# Patient Record
Sex: FEMALE | Race: WHITE | NOT HISPANIC OR LATINO | URBAN - METROPOLITAN AREA
[De-identification: names, ages, dates, MRNs, and addresses within clinical notes are randomized per-mention and may not be internally consistent; named-entity substitution may affect disease eponyms.]

---

## 2023-06-13 ENCOUNTER — NURSING HOME VISIT (OUTPATIENT)
Dept: POST ACUTE CARE | Facility: EXTERNAL LOCATION | Age: 78
End: 2023-06-13
Payer: MEDICARE

## 2023-06-13 DIAGNOSIS — G45.9 TIA (TRANSIENT ISCHEMIC ATTACK): ICD-10-CM

## 2023-06-13 DIAGNOSIS — I10 BENIGN ESSENTIAL HYPERTENSION: ICD-10-CM

## 2023-06-13 DIAGNOSIS — M54.16 LUMBAR RADICULOPATHY: ICD-10-CM

## 2023-06-13 DIAGNOSIS — Z98.890 S/P LAMINECTOMY: Primary | ICD-10-CM

## 2023-06-13 DIAGNOSIS — E03.9 ACQUIRED HYPOTHYROIDISM: ICD-10-CM

## 2023-06-13 DIAGNOSIS — E78.2 HYPERLIPEMIA, MIXED: ICD-10-CM

## 2023-06-13 DIAGNOSIS — K21.9 GERD WITHOUT ESOPHAGITIS: ICD-10-CM

## 2023-06-13 DIAGNOSIS — Z02.2 ENCOUNTER FOR EXAMINATION FOR ADMISSION TO NURSING HOME: ICD-10-CM

## 2023-06-13 DIAGNOSIS — Z98.890 HISTORY OF CEA (CAROTID ENDARTERECTOMY): ICD-10-CM

## 2023-06-13 DIAGNOSIS — M48.062 SPINAL STENOSIS OF LUMBAR REGION WITH NEUROGENIC CLAUDICATION: ICD-10-CM

## 2023-06-13 PROCEDURE — 99306 1ST NF CARE HIGH MDM 50: CPT | Performed by: INTERNAL MEDICINE

## 2023-06-13 NOTE — PROGRESS NOTES
Assessment and Plan:  Problem List Items Addressed This Visit          Nervous    Lumbar radiculopathy     B12 folic acid Lyrica PT OT            Circulatory    History of CEA (carotid endarterectomy) - Primary    Benign essential hypertension     Patients BP readings reviewed and addressed, as we age our arteries turn stiffer and less elastic. Restricting salt consumption and staying physically fit with regular exercise regimen is the only way to keep our vasculature less tonic. Studies have shown that keeping ideal body wt, exercise routine about 140 to 150 minutes a week, eating variety of plant based diet and drinking plentiful water are quite helpful. Monitor BP twice or once a week at home and bring log to be reviewed by me. Uncontrolled BP has long term consequences including heart failure, myocardial infarction, accelerated atherosclerosis and kidney dysfunction. Therapy reviewed and explained.           TIA (transient ischemic attack)       Digestive    GERD without esophagitis       Musculoskeletal    Spinal stenosis of lumbar region with neurogenic claudication     Outpatient follow-up with the neurosurgery            Endocrine/Metabolic    Acquired hypothyroidism       Other    Hyperlipemia, mixed    Encounter for examination for admission to nursing home     Rx list reviewed. PT and OT evaluation is in the process. Routine safety measures, fall precautions, risk modification and alarm placement if needed for prevention of falls. Skin care precautions, prevention of pressures sores at pressure points assessed. Pt needs to be monitored frequently by nursing staff particularly at night time. Any confusion, agitation or behavioural disturbance needs to be attended, as per home policy rapid covid Ag assay need to be done, notify if positive. If needed appropriate measures to be taken for alarm placements and assisted devices, pt was told not to get up and ambulate at night unless help and assist available at  bedside, labs will be done as per our routine protocol. PO intake need to be monitored if consuming po.     Chief Complaint:   Acute on chronic lower back pain status post lumbar laminectomy post for surgery decreased ADL mobility admitted for PT OT physical therapy Occupational Therapy fall prevention and pain medication      HPI:77 year old female admitted to [facility] on  for rehab. Had a lumbar lami and decompression, intertransverse fusion, spinal instrumentation L2-L3, and exploration and removal of fusion L3-L4 for spinal stenosis/spondylosis at . Was started on Lyrica post-op for RLE radiculopathy.  Continue back pain arthralgia myalgia fatigue tired constipation decreased ADL decreased mobility    Negative for hypoxia hypotension    Negative for fever or chills    Negative for bladder bowel dysfunction    PMHx: CAD, HTN, HLD, TIA, OA, hypothyroidism, GERD, OAB, spinal stenosis, carotid artery disease, aortic valve stenosis, cervical stenosis, COVID-19, PVD, RA,     PSHx: L TKR, R CEA, PCI with stents, bladder stimulator placement and removal, L3-L5 lumbar laminectomy, cystoscopy, hysterectomy,  x 3    Social Hx: , lives alone. Denies smoking, ETOH. Was using CBD cookies for pain relief prior to surgery.      : Na-139 Cl-107 K-4.8 Glucose-112 BUN-6 Creat-0.6 H&H-9.7/28.6 WBC-8.8 Platelets-235  : Iron-31 TIBC-287 % sat-10.8 Hgb A1c-4.8  : TSH-1.77        Patient Active Problem List:  Patient Active Problem List   Diagnosis    History of CEA (carotid endarterectomy)    Spinal stenosis of lumbar region with neurogenic claudication    Acquired hypothyroidism    GERD without esophagitis    Benign essential hypertension    Hyperlipemia, mixed    TIA (transient ischemic attack)    Lumbar radiculopathy    Encounter for examination for admission to nursing home          Comprehensive Medical/Surgical/Social/Family History:  No family history on file.    History reviewed. No pertinent past  medical history.    History reviewed. No pertinent surgical history.    Social History     Socioeconomic History    Marital status:      Spouse name: None    Number of children: None    Years of education: None    Highest education level: None   Occupational History    None   Tobacco Use    Smoking status: Never    Smokeless tobacco: Never   Substance and Sexual Activity    Alcohol use: Not Currently    Drug use: Not Currently    Sexual activity: None   Other Topics Concern    None   Social History Narrative    None     Social Determinants of Health     Financial Resource Strain: Not on file   Food Insecurity: Not on file   Transportation Needs: Not on file   Physical Activity: Not on file   Stress: Not on file   Social Connections: Not on file   Intimate Partner Violence: Not on file   Housing Stability: Not on file       Tobacco/Alcohol/Opioid use, as well as Illicit Drug Use was screened for/reviewed and documented in Social Documentation section of the chart and medication list as appropriate    Allergies and Medications  Not on File  No current outpatient medications on file.     No current facility-administered medications for this visit.       Medications and Supplements  prescribed by me and other practitioners or clinical pharmacist (such as prescriptions, OTC's, herbal therapies and supplements) were reviewed and documented in the medical record.     Advance directives  Advanced Care Planning (including a Living Will, Healthcare POA, as well as specific end of life choices and/or directives), was discussed for approximately 16 minutes with the patient and/or surrogate, voluntarily, and documented in the Problem List of the medical record.     Cardiac Risk Assessment  Cardiovascular risk was discussed and, if needed, lifestyle modifications recommended, including nutritional choices, exercise, and elimination of habits contributing to risk. We agreed on a plan to reduce the current cardiovascular  risk based on above discussion as needed.  Aspirin use/disuse was discussed and documented in the Problem List of the medical record after reviewing the updated guidelines below:    Consider low dose Aspirin ( mg) use if the benefit for cardiovascular disease prevention outweighs risk for bleeding complications.   In general, low dose ASA should be considered:  In patients WITHOUT prior MI/stroke/PAD (primary prevention):   a. Age <60: Use if 10-year cardiovascular disease risk >20%, with discussion of risks and benefits with patient  b. Age 60-<70: Use if 10-year cardiovascular disease risk >20% and low bleeding (e.g., gastrointenstinal) risk, with discussion of risks and benefits with patient  c. Age >=70: Do not use    In patients WITH prior MI/stroke/PAD (secondary prevention):   Generally use unless extremely high bleeding (e.g., gastrointenstinal) risk, with discussion of risks and benefits with patient    ROS otherwise negative aside from what was mentioned above in HPI.    Visit Vitals  Smoking Status Never     Current Vitals   BP: 130/72 mmHg  6/11/2023 12:32  Temp:98.1 °F  6/11/2023 12:32  Pulse:78 bpm  6/11/2023 12:32    Weight:182.8 Lbs  6/12/2023 07:38  Resp:17 Breaths/min  6/11/2023 12:32  BS:  O2:98 %  6/11/2023 12:32  Pain:6  6/13/2023 09:04  Physical Exam      Physical Exam:    Appearance: Alert, oriented , cooperative,  in no acute distress. Well nourished & well hydrated.    Skin: Intact,  dry skin, no lesions, rash, petechiae or purpura.     Eyes: PERRLA, EOMs intact,  Conjunctiva pink with no redness or exudates. Cornea & anterior chamber are clear, Eyelids without lesions. No scleral icterus.     ENT: Hearing grossly intact. External auditory canals patent, tympanic membranes intact with visible landmarks. Nares patent, mucus membranes moist. Dentition without lesions. Pharynx clear, uvula midline.     Neck: Supple, without meningismus. Thyroid not palpable. Trachea at midline. No  lymphadenopathy.    Pulmonary: Expiratory rhonchi  Cardiac: Systolic diastolic heart murmur  Abdomen: Soft, nontender, active bowel sounds.  No palpable organomegaly.  No rebound or guarding.  No CVA tenderness.    Genitourinary: Exam deferred.    Musculoskeletal: Postoperative lumbosacral tenderness more on the right compared to left  Neurological: Right sciatica tingling numbness  Psychiatric: Appropriate mood and affect.     During the course of the visit the patient was educated and counseled about age appropriate screening and preventive services. Completed preventive screenings were documented in the chart and orders were placed for outstanding screenings/procedures as documented in the Assessment and Plan.    Patient Instructions (the written plan) was given to the patient at check out.    Charting was completed using voice recognition technology and may include unintended errors.

## 2023-06-13 NOTE — LETTER
Patient: Freedom Jose  : 1945    Encounter Date: 2023    Assessment and Plan:  Problem List Items Addressed This Visit          Nervous    Lumbar radiculopathy     B12 folic acid Lyrica PT OT            Circulatory    History of CEA (carotid endarterectomy) - Primary    Benign essential hypertension     Patients BP readings reviewed and addressed, as we age our arteries turn stiffer and less elastic. Restricting salt consumption and staying physically fit with regular exercise regimen is the only way to keep our vasculature less tonic. Studies have shown that keeping ideal body wt, exercise routine about 140 to 150 minutes a week, eating variety of plant based diet and drinking plentiful water are quite helpful. Monitor BP twice or once a week at home and bring log to be reviewed by me. Uncontrolled BP has long term consequences including heart failure, myocardial infarction, accelerated atherosclerosis and kidney dysfunction. Therapy reviewed and explained.           TIA (transient ischemic attack)       Digestive    GERD without esophagitis       Musculoskeletal    Spinal stenosis of lumbar region with neurogenic claudication     Outpatient follow-up with the neurosurgery            Endocrine/Metabolic    Acquired hypothyroidism       Other    Hyperlipemia, mixed    Encounter for examination for admission to nursing home     Rx list reviewed. PT and OT evaluation is in the process. Routine safety measures, fall precautions, risk modification and alarm placement if needed for prevention of falls. Skin care precautions, prevention of pressures sores at pressure points assessed. Pt needs to be monitored frequently by nursing staff particularly at night time. Any confusion, agitation or behavioural disturbance needs to be attended, as per home policy rapid covid Ag assay need to be done, notify if positive. If needed appropriate measures to be taken for alarm placements and assisted devices, pt was  told not to get up and ambulate at night unless help and assist available at bedside, labs will be done as per our routine protocol. PO intake need to be monitored if consuming po.     Chief Complaint:   Acute on chronic lower back pain status post lumbar laminectomy post for surgery decreased ADL mobility admitted for PT OT physical therapy Occupational Therapy fall prevention and pain medication      HPI:77 year old female admitted to [facility] on  for rehab. Had a lumbar lami and decompression, intertransverse fusion, spinal instrumentation L2-L3, and exploration and removal of fusion L3-L4 for spinal stenosis/spondylosis at . Was started on Lyrica post-op for RLE radiculopathy.  Continue back pain arthralgia myalgia fatigue tired constipation decreased ADL decreased mobility    Negative for hypoxia hypotension    Negative for fever or chills    Negative for bladder bowel dysfunction    PMHx: CAD, HTN, HLD, TIA, OA, hypothyroidism, GERD, OAB, spinal stenosis, carotid artery disease, aortic valve stenosis, cervical stenosis, COVID-19, PVD, RA,     PSHx: L TKR, R CEA, PCI with stents, bladder stimulator placement and removal, L3-L5 lumbar laminectomy, cystoscopy, hysterectomy,  x 3    Social Hx: , lives alone. Denies smoking, ETOH. Was using CBD cookies for pain relief prior to surgery.      : Na-139 Cl-107 K-4.8 Glucose-112 BUN-6 Creat-0.6 H&H-9.7/28.6 WBC-8.8 Platelets-235  : Iron-31 TIBC-287 % sat-10.8 Hgb A1c-4.8  : TSH-1.77        Patient Active Problem List:  Patient Active Problem List   Diagnosis   • History of CEA (carotid endarterectomy)   • Spinal stenosis of lumbar region with neurogenic claudication   • Acquired hypothyroidism   • GERD without esophagitis   • Benign essential hypertension   • Hyperlipemia, mixed   • TIA (transient ischemic attack)   • Lumbar radiculopathy   • Encounter for examination for admission to nursing home          Comprehensive  Medical/Surgical/Social/Family History:  No family history on file.    History reviewed. No pertinent past medical history.    History reviewed. No pertinent surgical history.    Social History     Socioeconomic History   • Marital status:      Spouse name: None   • Number of children: None   • Years of education: None   • Highest education level: None   Occupational History   • None   Tobacco Use   • Smoking status: Never   • Smokeless tobacco: Never   Substance and Sexual Activity   • Alcohol use: Not Currently   • Drug use: Not Currently   • Sexual activity: None   Other Topics Concern   • None   Social History Narrative   • None     Social Determinants of Health     Financial Resource Strain: Not on file   Food Insecurity: Not on file   Transportation Needs: Not on file   Physical Activity: Not on file   Stress: Not on file   Social Connections: Not on file   Intimate Partner Violence: Not on file   Housing Stability: Not on file       Tobacco/Alcohol/Opioid use, as well as Illicit Drug Use was screened for/reviewed and documented in Social Documentation section of the chart and medication list as appropriate    Allergies and Medications  Not on File  No current outpatient medications on file.     No current facility-administered medications for this visit.       Medications and Supplements  prescribed by me and other practitioners or clinical pharmacist (such as prescriptions, OTC's, herbal therapies and supplements) were reviewed and documented in the medical record.     Advance directives  Advanced Care Planning (including a Living Will, Healthcare POA, as well as specific end of life choices and/or directives), was discussed for approximately 16 minutes with the patient and/or surrogate, voluntarily, and documented in the Problem List of the medical record.     Cardiac Risk Assessment  Cardiovascular risk was discussed and, if needed, lifestyle modifications recommended, including nutritional choices,  exercise, and elimination of habits contributing to risk. We agreed on a plan to reduce the current cardiovascular risk based on above discussion as needed.  Aspirin use/disuse was discussed and documented in the Problem List of the medical record after reviewing the updated guidelines below:    Consider low dose Aspirin ( mg) use if the benefit for cardiovascular disease prevention outweighs risk for bleeding complications.   In general, low dose ASA should be considered:  In patients WITHOUT prior MI/stroke/PAD (primary prevention):   a. Age <60: Use if 10-year cardiovascular disease risk >20%, with discussion of risks and benefits with patient  b. Age 60-<70: Use if 10-year cardiovascular disease risk >20% and low bleeding (e.g., gastrointenstinal) risk, with discussion of risks and benefits with patient  c. Age >=70: Do not use    In patients WITH prior MI/stroke/PAD (secondary prevention):   Generally use unless extremely high bleeding (e.g., gastrointenstinal) risk, with discussion of risks and benefits with patient    ROS otherwise negative aside from what was mentioned above in HPI.    Visit Vitals  Smoking Status Never     Current Vitals   BP: 130/72 mmHg  6/11/2023 12:32  Temp:98.1 °F  6/11/2023 12:32  Pulse:78 bpm  6/11/2023 12:32    Weight:182.8 Lbs  6/12/2023 07:38  Resp:17 Breaths/min  6/11/2023 12:32  BS:  O2:98 %  6/11/2023 12:32  Pain:6  6/13/2023 09:04  Physical Exam      Physical Exam:    Appearance: Alert, oriented , cooperative,  in no acute distress. Well nourished & well hydrated.    Skin: Intact,  dry skin, no lesions, rash, petechiae or purpura.     Eyes: PERRLA, EOMs intact,  Conjunctiva pink with no redness or exudates. Cornea & anterior chamber are clear, Eyelids without lesions. No scleral icterus.     ENT: Hearing grossly intact. External auditory canals patent, tympanic membranes intact with visible landmarks. Nares patent, mucus membranes moist. Dentition without lesions.  Pharynx clear, uvula midline.     Neck: Supple, without meningismus. Thyroid not palpable. Trachea at midline. No lymphadenopathy.    Pulmonary: Expiratory rhonchi  Cardiac: Systolic diastolic heart murmur  Abdomen: Soft, nontender, active bowel sounds.  No palpable organomegaly.  No rebound or guarding.  No CVA tenderness.    Genitourinary: Exam deferred.    Musculoskeletal: Postoperative lumbosacral tenderness more on the right compared to left  Neurological: Right sciatica tingling numbness  Psychiatric: Appropriate mood and affect.     During the course of the visit the patient was educated and counseled about age appropriate screening and preventive services. Completed preventive screenings were documented in the chart and orders were placed for outstanding screenings/procedures as documented in the Assessment and Plan.    Patient Instructions (the written plan) was given to the patient at check out.    Charting was completed using voice recognition technology and may include unintended errors.      Electronically Signed By: Sony Taylor MD   6/13/23 10:54 AM

## 2023-06-24 ENCOUNTER — NURSING HOME VISIT (OUTPATIENT)
Dept: POST ACUTE CARE | Facility: EXTERNAL LOCATION | Age: 78
End: 2023-06-24
Payer: MEDICARE

## 2023-06-24 DIAGNOSIS — J84.89 AUTOIMMUNE INTERSTITIAL LUNG DISEASE AND ARTHRITIS SYNDROME (MULTI): ICD-10-CM

## 2023-06-24 DIAGNOSIS — E03.9 ACQUIRED HYPOTHYROIDISM: ICD-10-CM

## 2023-06-24 DIAGNOSIS — I10 BENIGN ESSENTIAL HYPERTENSION: ICD-10-CM

## 2023-06-24 DIAGNOSIS — M48.062 SPINAL STENOSIS OF LUMBAR REGION WITH NEUROGENIC CLAUDICATION: ICD-10-CM

## 2023-06-24 DIAGNOSIS — M54.16 LUMBAR RADICULOPATHY: Primary | ICD-10-CM

## 2023-06-24 DIAGNOSIS — M19.90 AUTOIMMUNE INTERSTITIAL LUNG DISEASE AND ARTHRITIS SYNDROME (MULTI): ICD-10-CM

## 2023-06-24 DIAGNOSIS — K21.00 GASTROESOPHAGEAL REFLUX DISEASE WITH ESOPHAGITIS, UNSPECIFIED WHETHER HEMORRHAGE: ICD-10-CM

## 2023-06-24 PROCEDURE — 99308 SBSQ NF CARE LOW MDM 20: CPT | Performed by: INTERNAL MEDICINE

## 2023-06-24 NOTE — LETTER
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 Restricting salt consumption and staying physically fit with regular exercise regimen is the only way to keep our vasculature less tonic. Studies have shown that keeping ideal body wt, exercise routine about 140 to 150 minutes a week, eating variety of plant based diet and drinking plentiful water are quite helpful. Monitor BP twice or once a week at home and bring log to be reviewed by me. Uncontrolled BP has long term consequences including heart failure, myocardial infarction, accelerated atherosclerosis and kidney dysfunction. Therapy reviewed and explained.              Digestive    Gastroesophageal reflux disease with esophagitis       Musculoskeletal    Spinal stenosis of lumbar region with neurogenic claudication     Physical therapy Occupational Therapy narcotics watch for bladder bowel dysfunction            Endocrine/Metabolic    Acquired hypothyroidism     Check thyroid and calcium        Rx list reviewed. PT and OT evaluation is in the process. Routine safety measures, fall precautions, risk modification and alarm placement if needed for prevention of falls. Skin care precautions, prevention of pressures sores at pressure points assessed. Pt needs to be monitored frequently by nursing staff particularly at night time. Any confusion, agitation or behavioural disturbance needs to be attended, as per home policy rapid covid Ag assay need to be done,  notify if positive. If needed appropriate measures to be taken for alarm placements and assisted devices, pt was told not to get up and ambulate at night unless help and assist available at bedside, labs will be done as per our routine protocol. PO intake need to be monitored if consuming po.     Chief Complaint:   Acute on chronic lower back pain status post lumbar laminectomy post for surgery decreased ADL mobility admitted for PT OT physical therapy Occupational Therapy fall prevention and pain medication    77-year-old patient evaluated for nausea and stomach pain constipations thyroid dysfunction moderate pain advised continue Zofran omeprazole MiraLAX for pain continue Lyrica oxycodone autoimmune disease continue B12 folic acid methotrexate      Negative for hypoxia hypotension    Negative for fever or chills    Negative for bladder bowel dysfunction    PMHx: CAD, HTN, HLD, TIA, OA, hypothyroidism, GERD, OAB, spinal stenosis, carotid artery disease, aortic valve stenosis, cervical stenosis, COVID-19, PVD, RA,     PSHx: L TKR, R CEA, PCI with stents, bladder stimulator placement and removal, L3-L5 lumbar laminectomy, cystoscopy, hysterectomy,  x 3    Social Hx: , lives alone. Denies smoking, ETOH. Was using CBD cookies for pain relief prior to surgery.      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    History reviewed. No pertinent past medical history.    History reviewed. No pertinent surgical history.    Social History     Socioeconomic History   • Marital status:      Spouse name: None   • Number of children: None   • Years of education: None   • Highest education level: None   Occupational History   • None   Tobacco Use   • Smoking status: Never   • Smokeless tobacco: Never   Substance and Sexual Activity   • Alcohol use: Not Currently   • Drug use: Not Currently   • Sexual activity: None   Other Topics Concern   • None   Social History Narrative   • None     Social Determinants of Health     Financial Resource Strain: Not on file   Food Insecurity: Not on file   Transportation Needs: Not on file   Physical Activity: Not on file   Stress: Not on file   Social Connections: Not on file   Intimate Partner Violence: Not on file   Housing Stability: Not on file       Tobacco/Alcohol/Opioid use, as well as Illicit Drug Use was screened for/reviewed and documented in Social Documentation section of the chart and medication list as appropriate    Allergies and Medications  Not on File  No current outpatient medications on file.     No current facility-administered medications for this visit.               Visit Vitals  Smoking Status Never     Current Vitals   BP: 130/72 mmHg  6/11/2023 12:32  Temp:98.1 °F  6/11/2023 12:32  Pulse:78 bpm  6/11/2023 12:32    Weight:182.8 Lbs  6/12/2023 07:38  Resp:17 Breaths/min  6/11/2023 12:32  BS:  O2:98 %  6/11/2023 12:32  Pain:6  6/13/2023 09:04  Physical Exam      Physical Exam:    Appearance: Alert, oriented , cooperative,  in no acute distress. Well nourished & well hydrated.    Skin: Intact,  dry skin, no lesions, rash, petechiae or purpura.     Eyes: PERRLA, EOMs intact,  Conjunctiva pink with no redness or exudates. Cornea &  anterior chamber are clear, Eyelids without lesions. No scleral icterus.     ENT: Hearing grossly intact. External auditory canals patent, tympanic membranes intact with visible landmarks. Nares patent, mucus membranes moist. Dentition without lesions. Pharynx clear, uvula midline.     Neck: Supple, without meningismus. Thyroid not palpable. Trachea at midline. No lymphadenopathy.    Pulmonary: Expiratory rhonchi  Cardiac: Systolic diastolic heart murmur  Abdomen: Soft, nontender, active bowel sounds.  No palpable organomegaly.  No rebound or guarding.  No CVA tenderness.    Genitourinary: Exam deferred.    Musculoskeletal: Postoperative lumbosacral tenderness more on the right compared to left  Neurological: Right sciatica tingling numbness  Psychiatric: Appropriate mood and affect.     During the course of the visit the patient was educated and counseled about age appropriate screening and preventive services. Completed preventive screenings were documented in the chart and orders were placed for outstanding screenings/procedures as documented in the Assessment and Plan.    Patient Instructions (the written plan) was given to the patient at check out.    Charting was completed using voice recognition technology and may include unintended errors.      Electronically Signed By: Sony Taylor MD   6/24/23  9:58 AM

## 2023-06-24 NOTE — PROGRESS NOTES
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 Restricting salt consumption and staying physically fit with regular exercise regimen is the only way to keep our vasculature less tonic. Studies have shown that keeping ideal body wt, exercise routine about 140 to 150 minutes a week, eating variety of plant based diet and drinking plentiful water are quite helpful. Monitor BP twice or once a week at home and bring log to be reviewed by me. Uncontrolled BP has long term consequences including heart failure, myocardial infarction, accelerated atherosclerosis and kidney dysfunction. Therapy reviewed and explained.              Digestive    Gastroesophageal reflux disease with esophagitis       Musculoskeletal    Spinal stenosis of lumbar region with neurogenic claudication     Physical therapy Occupational Therapy narcotics watch for bladder bowel dysfunction            Endocrine/Metabolic    Acquired hypothyroidism     Check thyroid and calcium        Rx list reviewed. PT and OT evaluation is in the process. Routine safety measures, fall precautions, risk modification and alarm placement if needed for prevention of falls. Skin care precautions, prevention of pressures sores at pressure points assessed. Pt needs to be monitored frequently by nursing staff particularly at night time. Any confusion, agitation or behavioural disturbance needs to be attended, as per home policy rapid covid Ag assay need to be done, notify if positive. If needed appropriate measures to be taken for alarm  placements and assisted devices, pt was told not to get up and ambulate at night unless help and assist available at bedside, labs will be done as per our routine protocol. PO intake need to be monitored if consuming po.     Chief Complaint:   Acute on chronic lower back pain status post lumbar laminectomy post for surgery decreased ADL mobility admitted for PT OT physical therapy Occupational Therapy fall prevention and pain medication    77-year-old patient evaluated for nausea and stomach pain constipations thyroid dysfunction moderate pain advised continue Zofran omeprazole MiraLAX for pain continue Lyrica oxycodone autoimmune disease continue B12 folic acid methotrexate      Negative for hypoxia hypotension    Negative for fever or chills    Negative for bladder bowel dysfunction    PMHx: CAD, HTN, HLD, TIA, OA, hypothyroidism, GERD, OAB, spinal stenosis, carotid artery disease, aortic valve stenosis, cervical stenosis, COVID-19, PVD, RA,     PSHx: L TKR, R CEA, PCI with stents, bladder stimulator placement and removal, L3-L5 lumbar laminectomy, cystoscopy, hysterectomy,  x 3    Social Hx: , lives alone. Denies smoking, ETOH. Was using CBD cookies for pain relief prior to surgery.      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    History reviewed. No pertinent past medical history.    History reviewed. No pertinent surgical history.    Social History     Socioeconomic History    Marital status:      Spouse name: None    Number of children: None    Years of education: None    Highest education level: None   Occupational History    None   Tobacco Use    Smoking status: Never    Smokeless tobacco: Never   Substance and Sexual Activity    Alcohol use: Not Currently    Drug use: Not Currently    Sexual activity: None   Other Topics Concern    None   Social History Narrative    None     Social Determinants of Health     Financial Resource Strain: Not on file   Food Insecurity: Not on file   Transportation Needs: Not on file   Physical Activity: Not on file   Stress: Not on file   Social Connections: Not on file   Intimate Partner Violence: Not on file   Housing Stability: Not on file       Tobacco/Alcohol/Opioid use, as well as Illicit Drug Use was screened for/reviewed and documented in Social Documentation section of the chart and medication list as appropriate    Allergies and Medications  Not on File  No current outpatient medications on file.     No current facility-administered medications for this visit.               Visit Vitals  Smoking Status Never     Current Vitals   BP: 130/72 mmHg  6/11/2023 12:32  Temp:98.1 °F  6/11/2023 12:32  Pulse:78 bpm  6/11/2023 12:32    Weight:182.8 Lbs  6/12/2023 07:38  Resp:17 Breaths/min  6/11/2023 12:32  BS:  O2:98 %  6/11/2023 12:32  Pain:6  6/13/2023 09:04  Physical Exam      Physical Exam:    Appearance: Alert, oriented , cooperative,  in no acute distress. Well nourished & well hydrated.    Skin: Intact,  dry skin, no lesions, rash, petechiae or purpura.     Eyes: PERRLA, EOMs intact,  Conjunctiva pink with no redness or exudates. Cornea & anterior chamber are clear, Eyelids without lesions. No scleral icterus.     ENT:  Hearing grossly intact. External auditory canals patent, tympanic membranes intact with visible landmarks. Nares patent, mucus membranes moist. Dentition without lesions. Pharynx clear, uvula midline.     Neck: Supple, without meningismus. Thyroid not palpable. Trachea at midline. No lymphadenopathy.    Pulmonary: Expiratory rhonchi  Cardiac: Systolic diastolic heart murmur  Abdomen: Soft, nontender, active bowel sounds.  No palpable organomegaly.  No rebound or guarding.  No CVA tenderness.    Genitourinary: Exam deferred.    Musculoskeletal: Postoperative lumbosacral tenderness more on the right compared to left  Neurological: Right sciatica tingling numbness  Psychiatric: Appropriate mood and affect.     During the course of the visit the patient was educated and counseled about age appropriate screening and preventive services. Completed preventive screenings were documented in the chart and orders were placed for outstanding screenings/procedures as documented in the Assessment and Plan.    Patient Instructions (the written plan) was given to the patient at check out.    Charting was completed using voice recognition technology and may include unintended errors.

## 2024-02-20 ENCOUNTER — NURSING HOME VISIT (OUTPATIENT)
Dept: POST ACUTE CARE | Facility: EXTERNAL LOCATION | Age: 79
End: 2024-02-20
Payer: MEDICARE

## 2024-02-20 DIAGNOSIS — J84.89 AUTOIMMUNE INTERSTITIAL LUNG DISEASE AND ARTHRITIS SYNDROME (MULTI): ICD-10-CM

## 2024-02-20 DIAGNOSIS — I43 CARDIOMYOPATHY, HYPERTENSIVE, BENIGN, WITHOUT HEART FAILURE (MULTI): ICD-10-CM

## 2024-02-20 DIAGNOSIS — Z98.890 HISTORY OF CEA (CAROTID ENDARTERECTOMY): ICD-10-CM

## 2024-02-20 DIAGNOSIS — I11.9 CARDIOMYOPATHY, HYPERTENSIVE, BENIGN, WITHOUT HEART FAILURE (MULTI): ICD-10-CM

## 2024-02-20 DIAGNOSIS — M48.062 SPINAL STENOSIS OF LUMBAR REGION WITH NEUROGENIC CLAUDICATION: Primary | ICD-10-CM

## 2024-02-20 DIAGNOSIS — I25.118 CORONARY ARTERY DISEASE INVOLVING NATIVE HEART WITH OTHER FORM OF ANGINA PECTORIS, UNSPECIFIED VESSEL OR LESION TYPE (CMS-HCC): ICD-10-CM

## 2024-02-20 DIAGNOSIS — M19.90 AUTOIMMUNE INTERSTITIAL LUNG DISEASE AND ARTHRITIS SYNDROME (MULTI): ICD-10-CM

## 2024-02-20 PROCEDURE — 99497 ADVNCD CARE PLAN 30 MIN: CPT | Performed by: EMERGENCY MEDICINE

## 2024-02-20 PROCEDURE — 99305 1ST NF CARE MODERATE MDM 35: CPT | Performed by: EMERGENCY MEDICINE

## 2024-02-20 NOTE — LETTER
Patient: Freedom Jose  : 1945    Encounter Date: 2024    Freedom Jose   78 y.o.  female  @location@            Assessment and Plan:  History and physical    Diagnosis     Lumbar pain - OAR52-QT M54.50, Medical.     Hypothyroidism - SKG45-SX E03.9, Medical.     Hypertension - HPF23-QQ I10, Medical.     Hyperlipidemia - UGH60-BZ E78.5, Medical.     Insomnia - DGM14-SA G47.00, Medical.       Lumbar pain  H/O lumbar fusion L2-3 & L4-5 with Dr. Huff  CT of the lumbar: No acute findings  Obtain MRI lumbar spine  Decadron 10 mg IV every 8 x 3 doses  Skelaxin 400 mg p.o. every 8 hours  Toradol 30 mg IV every 8 hours  Percocet every 4 hours as needed  For breakthrough pain, may use Dilaudid 0.5 mg IV every 4 hours as needed  PT/OT eval and treat  Neurosurgery consult: Dr. Darius MD    Hypothyroidism  Resume home medication Synthroid 137 mcg p.o. daily  Patient can follow-up to PCP for outpatient indices and management of hypothyroid medications    Hypertension  /70, 147/76, 103/60  Denies headache, visual changes, or lightheadedness with position change  Resume lisinopril 10 mg p.o. daily    Hyperlipidemia  Resume home medication rosuvastatin 20 mg p.o. daily  Denies myalgias/arthralgias sometimes associated with statin use  F/U outpatient to PCP for management of lipid indices    Coronary artery disease  Resume home medication ASA 81 mg p.o. daily    Insomnia  Resume home medication trazodone 100 mg p.o. daily at at bedtime home     ASPIRIN EC 81MG TAB  81 mg 1 tabs, ORAL, DAILY  CHOLECALCIFEROL 1000 Intl Unit (25mcg) TAB  125 mcg 5 tabs, ORAL, DAILY  KETOROLAC 15MG/1ML INJ  15 mg 1 mL, IV Push, [id number]  LEVOTHYROXINE 137MCG TABLET  137 mcg 1 tabs, ORAL, DAILY BEFORE BREAKFAST  LIDOCAINE 4% PATCH  1 patches, Topical, DAILY  LIDOCAINE PATCH  SITE CHECK  1 patches, Transderm, [id number]  LIDOCAINE PATCH OFF  1 patches, Transderm, QHS  LISINOPRIL 10MG TABLET  10 mg 1 tabs, ORAL, DAILY  MAGNESIUM LACTATE  "84 MG SR TAB  84 mg 1 tabs, ORAL, DAILY  METAXALONE 800MG TAB  400 mg 0.5 tabs, ORAL, [id number]  MULTIPLE VITAMIN (THERAPEUTIC)  1 tabs, ORAL, DAILY  PREGABALIN 150MG CAPSULE  150 mg 1 caps, ORAL, DAILY  PREGABALIN 75MG CAPSULE  225 mg 3 caps, ORAL, QHS  ROSUVASTATIN 20MG TAB  20 mg 1 tabs, ORAL, DAILY  TrazODONE 100MG TABLET  100 mg 1 tabs, ORAL, at bedtime    I discussed advanced care planning including the explanation and discussion of advanced directives. If patient does not have current up to date documents, examples and information provided on how to create both living will and power of . Patient was encouraged to work on completing these documents.  Information and advise was also provided on DO NOT RESUSCITATE and patient encouraged to consider this  Patient is not sure about DNR at this time.  CODE STATUS is on file with the facility    Source of history: Nurse, Medical personnel, Medical record, Patient.  History limitation: None.    HPI:  History and physical    Patient is unable to give any detailed history and therefore history is obtained from the chart  No acute complaints voiced by the patient or acute concerns raised by nursing    History of hospitalization-  Chief Complaint   Back pain        History of Present Illness   79 yo female with H/O prior lumbar decompression (L2-3 & L4-5) with Dr. Huff.  Patient reports this past Tuesday (2/13/2024), she was bending over to pick something up when she \"felt a rip in her back\" which resulted in severe low back pain with bilateral lower extremity radiculopathy. She tried to rest her back, has been using Norflex that she had at home thinking that this would improve, and when she was no longer able to tolerate the discomfort she called her neurosurgeon Dr. Huff.  Dr. Huff advised her who advised her to come to the emergency department for evaluation and to request a consult with Dr. Veloz.  She states that she has been unable to have a bowel " movement and limited voiding since pain began.  Denies numbness/tingling in her groin.  She is having significant difficulty ambulating.  In ED, labs essentially unremarkable, potassium is marginally elevated at 5.2, as well as a CRP of 1.8.  UA unremarkable, spillage of both protein and ketones is noted.  In ED, patient received dexamethasone 10 IV, fentanyl IV x 2, Dilaudid IV x 1, lidocaine patch, Robaxin, Zofran, Norflex x 2 & Lyrica to obtain a modicum of pain relief.  She is unable to ambulate.  CT of the lumbar region without acute findings.  Chest x-ray no acute cardiopulmonary findings.  Currently in significant discomfort, tremendous amount of difficulty with ambulation, which elicits severe lower back pain.  Awaiting PT/OT evaluation.    Problem List/Past Medical History Ongoing Abnormal gait Acquired hypothyroidism Aortic valve stenosis Autoimmune interstitial lung disease, arthritis syndrome Brain TIA CAD (coronary artery disease) Cardiac murmur Cervical spondylosis without myelopathy Chronic low back pain Constipation Coronary arteriosclerosis COVID-19 Disorder of carotid artery FH: Alzheimer's disease Gastroesophageal reflux disease Generalized osteoarthritis History of left total knee replacement History of transient ischemic attack HLD (hyperlipidemia) HTN (hypertension) Hypertensive disorder Hypothyroid Hypothyroidism Intractable low back pain Low back pain Low income Lumbar radiculopathy Lumbosacral plexus lesion Morbid obesity Nodule of lung OAB (overactive bladder) Obesity Osteoarthritis of right hip joint Osteoarthritis of right knee joint Pain in right hip Peripheral vascular disease Presence of left artificial knee joint Rheumatoid arthritis of multiple joints Seropositive rheumatoid arthritis Spinal stenosis of lumbar region Stercoral colitis Tension-type headache Urgent desire to urinate Vitamin D deficiency Procedure/Surgical History   EXPLORE FUSION AND REMOVE INSTRUMENTATION L3-4;  LAMINECTOMY AND INSTRUMENTED POSTERIOR FUSION L2-3 (06/05/2023)   Bladder Stimulator removed (2023)   LTKA (2022)   Laminectomy Lumbar fusion L3-5 Dr. Huff (2011)   Cath with PCI to prox-mid LAD (2010)   Cysto/urodynamic (2006)   Carotid Endarterectomy (R) (2003)   TRISHA/BSO (1992)   Colonoscopy   C Section x3    Allergies Cymbalta shakes Social History   Alcohol Use:Current Frequency:1-2 times per week   Home/Environment Lives with:Alone *Living Situation Prior to AdmissionHome/Independent Home equipment:Walker/Cane Monitoring Equipment in homeNone *Special Services and Community Resources Prior to AdmissionNone *Mobility Assistance Prior to AdmissionIndependent Home BarriersExternal Stairs, Internal Stairs *Will patient require additional/new services upon discharge?No   Substance Abuse - Denies Substance Abuse   Tobacco Use:Never (less than 100 in lifetime) Family History Family history is negative      Physical Exam:  Vital signs as per nursing/MA documentation were reviewed  General appearance: Alert and in no acute distress  HEENT: Normal Inspection  Neck - Normal Inspection  Respiratory : No respiratory distress. Lungs are clear   Cardiovascular: heart rate normal. No gallop  Back - normal inspection  Skin inspection:Warm  Musculoskeletal : No deformities  Neuro : Limited exam. Baseline    ROS: Review of symptoms is negative except for what is mentioned in HPI    Results/Data  Records including but not limited to electronic medical records, relevant lab work and imaging from patient's health care facility encounter were reviewed and independently verified      Charting was completed using voice recognition technology and may include unintended errors.    Discussed with patient/family, RN, and NP.      Electronically Signed By: Dominick Johnson MD   2/22/24  4:38 PM

## 2024-02-22 PROBLEM — I11.9 CARDIOMYOPATHY, HYPERTENSIVE, BENIGN, WITHOUT HEART FAILURE (MULTI): Status: ACTIVE | Noted: 2024-02-22

## 2024-02-22 PROBLEM — I43 CARDIOMYOPATHY, HYPERTENSIVE, BENIGN, WITHOUT HEART FAILURE (MULTI): Status: ACTIVE | Noted: 2024-02-22

## 2024-02-22 PROBLEM — I25.10 CORONARY ARTERY DISEASE INVOLVING NATIVE HEART: Status: ACTIVE | Noted: 2024-02-22

## 2024-02-22 NOTE — PROGRESS NOTES
Freedom Jose   78 y.o.  female  @location@            Assessment and Plan:  History and physical    Diagnosis     Lumbar pain - [id number] M54.50, Medical.     Hypothyroidism - BST80-RX E03.9, Medical.     Hypertension - [id number]-KZ I10, Medical.     Hyperlipidemia - XPQ96-HJ E78.5, Medical.     Insomnia - EVN13-KJ G47.00, Medical.       Lumbar pain  H/O lumbar fusion L2-3 & L4-5 with Dr. Huff  CT of the lumbar: No acute findings  Obtain MRI lumbar spine  Decadron 10 mg IV every 8 x 3 doses  Skelaxin 400 mg p.o. every 8 hours  Toradol 30 mg IV every 8 hours  Percocet every 4 hours as needed  For breakthrough pain, may use Dilaudid 0.5 mg IV every 4 hours as needed  PT/OT eval and treat  Neurosurgery consult: Dr. Darius MD    Hypothyroidism  Resume home medication Synthroid 137 mcg p.o. daily  Patient can follow-up to PCP for outpatient indices and management of hypothyroid medications    Hypertension  /70, 147/76, 103/60  Denies headache, visual changes, or lightheadedness with position change  Resume lisinopril 10 mg p.o. daily    Hyperlipidemia  Resume home medication rosuvastatin 20 mg p.o. daily  Denies myalgias/arthralgias sometimes associated with statin use  F/U outpatient to PCP for management of lipid indices    Coronary artery disease  Resume home medication ASA 81 mg p.o. daily    Insomnia  Resume home medication trazodone 100 mg p.o. daily at at bedtime home     ASPIRIN EC 81MG TAB  81 mg 1 tabs, ORAL, DAILY  CHOLECALCIFEROL 1000 Intl Unit (25mcg) TAB  125 mcg 5 tabs, ORAL, DAILY  KETOROLAC 15MG/1ML INJ  15 mg 1 mL, IV Push, [id number]  LEVOTHYROXINE 137MCG TABLET  137 mcg 1 tabs, ORAL, DAILY BEFORE BREAKFAST  LIDOCAINE 4% PATCH  1 patches, Topical, DAILY  LIDOCAINE PATCH  SITE CHECK  1 patches, Transderm, [id number]  LIDOCAINE PATCH OFF  1 patches, Transderm, QHS  LISINOPRIL 10MG TABLET  10 mg 1 tabs, ORAL, DAILY  MAGNESIUM LACTATE 84 MG SR TAB  84 mg 1 tabs, ORAL, DAILY  METAXALONE 800MG TAB  400 mg 0.5  "tabs, ORAL, [id number]  MULTIPLE VITAMIN (THERAPEUTIC)  1 tabs, ORAL, DAILY  PREGABALIN 150MG CAPSULE  150 mg 1 caps, ORAL, DAILY  PREGABALIN 75MG CAPSULE  225 mg 3 caps, ORAL, QHS  ROSUVASTATIN 20MG TAB  20 mg 1 tabs, ORAL, DAILY  TrazODONE 100MG TABLET  100 mg 1 tabs, ORAL, at bedtime    I discussed advanced care planning including the explanation and discussion of advanced directives. If patient does not have current up to date documents, examples and information provided on how to create both living will and power of . Patient was encouraged to work on completing these documents.  Information and advise was also provided on DO NOT RESUSCITATE and patient encouraged to consider this  Patient is not sure about DNR at this time.  CODE STATUS is on file with the facility    Source of history: Nurse, Medical personnel, Medical record, Patient.  History limitation: None.    HPI:  History and physical    Patient is unable to give any detailed history and therefore history is obtained from the chart  No acute complaints voiced by the patient or acute concerns raised by nursing    History of hospitalization-  Chief Complaint   Back pain        History of Present Illness   77 yo female with H/O prior lumbar decompression (L2-3 & L4-5) with Dr. Huff.  Patient reports this past Tuesday (2/13/2024), she was bending over to pick something up when she \"felt a rip in her back\" which resulted in severe low back pain with bilateral lower extremity radiculopathy. She tried to rest her back, has been using Norflex that she had at home thinking that this would improve, and when she was no longer able to tolerate the discomfort she called her neurosurgeon Dr. Huff.  Dr. Huff advised her who advised her to come to the emergency department for evaluation and to request a consult with Dr. Veloz.  She states that she has been unable to have a bowel movement and limited voiding since pain began.  Denies numbness/tingling in her " groin.  She is having significant difficulty ambulating.  In ED, labs essentially unremarkable, potassium is marginally elevated at 5.2, as well as a CRP of 1.8.  UA unremarkable, spillage of both protein and ketones is noted.  In ED, patient received dexamethasone 10 IV, fentanyl IV x 2, Dilaudid IV x 1, lidocaine patch, Robaxin, Zofran, Norflex x 2 & Lyrica to obtain a modicum of pain relief.  She is unable to ambulate.  CT of the lumbar region without acute findings.  Chest x-ray no acute cardiopulmonary findings.  Currently in significant discomfort, tremendous amount of difficulty with ambulation, which elicits severe lower back pain.  Awaiting PT/OT evaluation.    Problem List/Past Medical History Ongoing Abnormal gait Acquired hypothyroidism Aortic valve stenosis Autoimmune interstitial lung disease, arthritis syndrome Brain TIA CAD (coronary artery disease) Cardiac murmur Cervical spondylosis without myelopathy Chronic low back pain Constipation Coronary arteriosclerosis COVID-19 Disorder of carotid artery FH: Alzheimer's disease Gastroesophageal reflux disease Generalized osteoarthritis History of left total knee replacement History of transient ischemic attack HLD (hyperlipidemia) HTN (hypertension) Hypertensive disorder Hypothyroid Hypothyroidism Intractable low back pain Low back pain Low income Lumbar radiculopathy Lumbosacral plexus lesion Morbid obesity Nodule of lung OAB (overactive bladder) Obesity Osteoarthritis of right hip joint Osteoarthritis of right knee joint Pain in right hip Peripheral vascular disease Presence of left artificial knee joint Rheumatoid arthritis of multiple joints Seropositive rheumatoid arthritis Spinal stenosis of lumbar region Stercoral colitis Tension-type headache Urgent desire to urinate Vitamin D deficiency Procedure/Surgical History   EXPLORE FUSION AND REMOVE INSTRUMENTATION L3-4; LAMINECTOMY AND INSTRUMENTED POSTERIOR FUSION L2-3 (06/05/2023)   Bladder Stimulator  removed (2023)   LTKA (2022)   Laminectomy Lumbar fusion L3-5 Dr. Huff (2011)   Cath with PCI to prox-mid LAD (2010)   Cysto/urodynamic (2006)   Carotid Endarterectomy (R) (2003)   TRISHA/BSO (1992)   Colonoscopy   C Section x3    Allergies Cymbalta shakes Social History   Alcohol Use:Current Frequency:1-2 times per week   Home/Environment Lives with:Alone *Living Situation Prior to AdmissionHome/Independent Home equipment:Walker/Cane Monitoring Equipment in homeNone *Special Services and Community Resources Prior to AdmissionNone *Mobility Assistance Prior to AdmissionIndependent Home BarriersExternal Stairs, Internal Stairs *Will patient require additional/new services upon discharge?No   Substance Abuse - Denies Substance Abuse   Tobacco Use:Never (less than 100 in lifetime) Family History Family history is negative      Physical Exam:  Vital signs as per nursing/MA documentation were reviewed  General appearance: Alert and in no acute distress  HEENT: Normal Inspection  Neck - Normal Inspection  Respiratory : No respiratory distress. Lungs are clear   Cardiovascular: heart rate normal. No gallop  Back - normal inspection  Skin inspection:Warm  Musculoskeletal : No deformities  Neuro : Limited exam. Baseline    ROS: Review of symptoms is negative except for what is mentioned in HPI    Results/Data  Records including but not limited to electronic medical records, relevant lab work and imaging from patient's health care facility encounter were reviewed and independently verified      Charting was completed using voice recognition technology and may include unintended errors.    Discussed with patient/family, RN, and NP.

## 2024-03-21 ENCOUNTER — NURSING HOME VISIT (OUTPATIENT)
Dept: POST ACUTE CARE | Facility: EXTERNAL LOCATION | Age: 79
End: 2024-03-21
Payer: MEDICARE

## 2024-03-21 DIAGNOSIS — M19.90 AUTOIMMUNE INTERSTITIAL LUNG DISEASE AND ARTHRITIS SYNDROME (MULTI): ICD-10-CM

## 2024-03-21 DIAGNOSIS — I25.118 CORONARY ARTERY DISEASE INVOLVING NATIVE HEART WITH OTHER FORM OF ANGINA PECTORIS, UNSPECIFIED VESSEL OR LESION TYPE (CMS-HCC): ICD-10-CM

## 2024-03-21 DIAGNOSIS — I11.9 CARDIOMYOPATHY, HYPERTENSIVE, BENIGN, WITHOUT HEART FAILURE (MULTI): ICD-10-CM

## 2024-03-21 DIAGNOSIS — G45.9 TIA DUE TO EMBOLISM (MULTI): ICD-10-CM

## 2024-03-21 DIAGNOSIS — I74.9 TIA DUE TO EMBOLISM (MULTI): ICD-10-CM

## 2024-03-21 DIAGNOSIS — I43 CARDIOMYOPATHY, HYPERTENSIVE, BENIGN, WITHOUT HEART FAILURE (MULTI): ICD-10-CM

## 2024-03-21 DIAGNOSIS — J84.89 AUTOIMMUNE INTERSTITIAL LUNG DISEASE AND ARTHRITIS SYNDROME (MULTI): ICD-10-CM

## 2024-03-21 DIAGNOSIS — R29.818 NEUROGENIC CLAUDICATION: Primary | ICD-10-CM

## 2024-03-21 PROCEDURE — 99497 ADVNCD CARE PLAN 30 MIN: CPT | Performed by: EMERGENCY MEDICINE

## 2024-03-21 PROCEDURE — 99306 1ST NF CARE HIGH MDM 50: CPT | Performed by: EMERGENCY MEDICINE

## 2024-03-21 NOTE — LETTER
Patient: Freedom Jose  : 1945    Encounter Date: 2024    Freedom Jose   78 y.o.  female  @location@        Patient was again admitted, to [facility]    Assessment and Plan:  History and physical    Diagnoses This Visit Intractable low back pain Back pain   Discharge Medications   New acetaminophen-oxycodone (acetaminophen-oxycodone 325 mg-5 mg oral tablet = Percocet)1 Tabs Oral THREE TIMES A DAY as needed Moderate Pain for 7 Days. Refills: 0.   Changed aspirin (aspirin 81 mg oral delayed release tablet)1 Tabs Oral DAILY. RESUME 3/17/2024.   Unchanged acetaminophen (Tylenol 8 HR Arthritis Pain 650 mg oral tablet, extended release)2 Tabs Oral TWICE A DAY. cholecalciferol (Vitamin D3 125 mcg (5000 intl units) oral tablet)1 Tabs Oral DAILY. levothyroxine (Synthroid 137 mcg (0.137 mg) oral tablet)1 Tabs Oral DAILY. lisinopril (Zestril 20 mg oral tablet)0.5 Tabs Oral DAILY. magnesium oxide (magnesium oxide 400 mg oral tablet)1 Tabs Oral DAILY. multivitamin1 Tabs Oral DAILY. predniSONE (predniSONE 5 mg oral tablet)2 Tabs Oral DAILY. pregabalin (Lyrica 150 mg oral capsule)1 Capsules Oral EVERY TWELVE HOURS. rosuvastatin (Crestor 20 mg oral tablet)1 Tabs Oral DAILY. trazodone = Desyrel (traZODone 100 mg oral tablet)1 Tabs Oral AT BEDTIME. upadacitinib (Rinvoq 15 mg oral tablet, extended release)1 Tabs Oral DAILY. do not chew or break tablets.   Discontinued naproxen (naproxen 500 mg oral tablet)1 Tabs Oral TWICE A DAY as needed pain. with food. orphenadrine = Norflex (orphenadrine 100 mg oral tablet, extended release)1 Tabs Oral TWICE A DAY. x5 days started 2024. Hospital Course     78-year-old female with history of lumbar spine surgery was admitted due to severe low back pain with radiation to the right leg. She was recently discharged from skilled nursing facility and could not be at home even 1 day due to her intractable pain which is likely from hardware failure screws L2-3 and severe  canal stenosis L2-3 with neurogenic claudication. Surgery was delayed until her ARU normalized. She is planned for surgery Monday morning.   Procedures and Treatment Provided No qualifying data available. Discharge Plan   This Visit Diagnosis 1. Intractable low back pain M54.59 In summary this is 78 years old lady with known history of severe spinal stenosis here with worsening lower back pain:    -Acute on chronic lower back pain with lumbar radiculopathy and neurogenic claudication:  neurosurgery evaluated the patient in the ED and recommended surgery  Her back pain and radiculopathy likely secondary to hardware failure and severe central canal stenosis, continue pain control  Patient underwent surgery on 3/11/2024. Patient is very uncomfortable today and could not sleep well last night. Patient received Percocet. Discussed with nursing and patient will also receive IV Dilaudid. Neurosurgery will be contacted.    -Hypothyroidism:  Continue Synthroid    -Hypertension:  Continue home medications    -History of coronary artery disease:  Aspirin has been on hold for scheduled surgery    -Constipation  Colace has been ordered for on a scheduled basis. MiraLAX scheduled daily. Lactulose and Senokot ordered x 1 today on 3/13/2024. Patient has not had a bowel movement since Monday, 3/11/2024    Disposition  Patient will be discharged to SNF facility once pre-CERT has been obtained. Pre-CERT restarted as facility had to be changed.    I discussed advanced care planning including the explanation and discussion of advanced directives. If patient does not have current up to date documents, examples and information provided on how to create both living will and power of . Patient was encouraged to work on completing these documents.  Information and advise was also provided on DO NOT RESUSCITATE and patient encouraged to consider this  Patient is not sure about DNR at this time.  CODE STATUS is on file with the  facility    Source of history: Nurse, Medical personnel, Medical record, Patient.  History limitation: None.    HPI:  History and physical    Patient is unable to give any detailed history and therefore history is obtained from the chart  No acute complaints voiced by the patient or acute concerns raised by nursing    History of hospitalization-    Chief Complaint Back pain History of Present Illness This is a 78 years old lady with known severe canal stenosis L2-3 following up with Dr. Mccoy , patient was just discharged from rehab recently and from the hospital end of February to follow-up outpatient, she said after discharge she started having the back pain again and today it got really worse to the point that she was having difficulty ambulating secondary to the pain, she also started having radiation to the right side with numbness in both foot, no stool incontinence, patient was evaluated by neurosurgery in the ED and plan for surgery in the a.m.     Problem List/Past Medical History Ongoing Abnormal gait Acquired hypothyroidism Aortic valve stenosis Autoimmune interstitial lung disease, arthritis syndrome Brain TIA CAD (coronary artery disease) Cardiac murmur Cervical spondylosis without myelopathy Chronic low back pain Constipation Coronary arteriosclerosis COVID-19 Disorder of carotid artery FH: Alzheimer's disease Gastroesophageal reflux disease Generalized osteoarthritis History of left total knee replacement History of transient ischemic attack HLD (hyperlipidemia) HTN (hypertension) Hypertensive disorder Hypothyroid Hypothyroidism Intractable low back pain Low back pain Low income Lumbar back pain with radiculopathy affecting left lower extremity Lumbar radiculopathy Lumbosacral plexus lesion Morbid obesity Nodule of lung OAB (overactive bladder) Obesity Osteoarthritis of right hip joint Osteoarthritis of right knee joint Pain in right hip Peripheral vascular disease Presence of left artificial knee  joint Rheumatoid arthritis of multiple joints Seropositive rheumatoid arthritis Spinal stenosis of lumbar region Stercoral colitis Tension-type headache Urgent desire to urinate Vitamin D deficiency Procedure/Surgical History EXPLORE FUSION AND REMOVE INSTRUMENTATION L3-4; LAMINECTOMY AND INSTRUMENTED POSTERIOR FUSION L2-3: 06/05/23  Bladder Stimulator removed: 2023  LTKA: 2022  Laminectomy Lumbar fusion L3-5 Dr. Huff: 2011  Cath with PCI to prox-mid LAD: 2010  Cysto/urodynamic: 2006  Carotid Endarterectomy (R): 2003  TRISHA/BSO: 1992  C Section x3  Colonoscopy     Allergies Cymbalta shakes Social History   Alcohol Use:Current Frequency:1-2 times per week   Home/Environment Lives with:Alone *Living Situation Prior to AdmissionHome/Independent Home equipment:Walker/Cane Monitoring Equipment in homeNone *Special Services and Community Resources Prior to AdmissionNone *Mobility Assistance Prior to AdmissionIndependent Home BarriersExternal Stairs, Internal Stairs *Will patient require additional/new services upon discharge?No   Substance Abuse - Denies Substance Abuse   Tobacco Use:Never (less than 100 in lifetime) Family History Family history is negative    No current outpatient medications on file.     No current facility-administered medications for this visit.       Physical Exam:  Vital signs as per nursing/MA documentation were reviewed  General appearance: Alert and in no acute distress  HEENT: Normal Inspection  Neck - Normal Inspection  Respiratory : No respiratory distress. Lungs are clear   Cardiovascular: heart rate normal. No gallop  Back - normal inspection  Skin inspection:Warm  Musculoskeletal : No deformities  Neuro : Limited exam. Baseline    ROS: Review of symptoms is negative except for what is mentioned in HPI    Results/Data  Records including but not limited to electronic medical records, relevant lab work and imaging from patient's health care facility encounter were reviewed and independently  verified      Charting was completed using voice recognition technology and may include unintended errors.    Discussed with patient/family, RN, and NP.      Electronically Signed By: Dominick Johnson MD   3/21/24  5:30 PM

## 2024-03-21 NOTE — PROGRESS NOTES
Freedom Jose   78 y.o.  female  @location@        Patient was again admitted, to [facility]    Assessment and Plan:  History and physical    Diagnoses This Visit Intractable low back pain Back pain   Discharge Medications   New acetaminophen-oxycodone (acetaminophen-oxycodone 325 mg-5 mg oral tablet = Percocet)1 Tabs Oral THREE TIMES A DAY as needed Moderate Pain for 7 Days. Refills: 0.   Changed aspirin (aspirin 81 mg oral delayed release tablet)1 Tabs Oral DAILY. RESUME 3/17/2024.   Unchanged acetaminophen (Tylenol 8 HR Arthritis Pain 650 mg oral tablet, extended release)2 Tabs Oral TWICE A DAY. cholecalciferol (Vitamin D3 125 mcg (5000 intl units) oral tablet)1 Tabs Oral DAILY. levothyroxine (Synthroid 137 mcg (0.137 mg) oral tablet)1 Tabs Oral DAILY. lisinopril (Zestril 20 mg oral tablet)0.5 Tabs Oral DAILY. magnesium oxide (magnesium oxide 400 mg oral tablet)1 Tabs Oral DAILY. multivitamin1 Tabs Oral DAILY. predniSONE (predniSONE 5 mg oral tablet)2 Tabs Oral DAILY. pregabalin (Lyrica 150 mg oral capsule)1 Capsules Oral EVERY TWELVE HOURS. rosuvastatin (Crestor 20 mg oral tablet)1 Tabs Oral DAILY. trazodone = Desyrel (traZODone 100 mg oral tablet)1 Tabs Oral AT BEDTIME. upadacitinib (Rinvoq 15 mg oral tablet, extended release)1 Tabs Oral DAILY. do not chew or break tablets.   Discontinued naproxen (naproxen 500 mg oral tablet)1 Tabs Oral TWICE A DAY as needed pain. with food. orphenadrine = Norflex (orphenadrine 100 mg oral tablet, extended release)1 Tabs Oral TWICE A DAY. x5 days started 2/13/2024. Hospital Course     78-year-old female with history of lumbar spine surgery was admitted due to severe low back pain with radiation to the right leg. She was recently discharged from skilled nursing facility and could not be at home even 1 day due to her intractable pain which is likely from hardware failure screws L2-3 and severe canal stenosis L2-3 with neurogenic claudication. Surgery was delayed until  her ARU normalized. She is planned for surgery Monday morning.   Procedures and Treatment Provided No qualifying data available. Discharge Plan   This Visit Diagnosis 1. Intractable low back pain M54.59 In summary this is 78 years old lady with known history of severe spinal stenosis here with worsening lower back pain:    -Acute on chronic lower back pain with lumbar radiculopathy and neurogenic claudication:  neurosurgery evaluated the patient in the ED and recommended surgery  Her back pain and radiculopathy likely secondary to hardware failure and severe central canal stenosis, continue pain control  Patient underwent surgery on 3/11/2024. Patient is very uncomfortable today and could not sleep well last night. Patient received Percocet. Discussed with nursing and patient will also receive IV Dilaudid. Neurosurgery will be contacted.    -Hypothyroidism:  Continue Synthroid    -Hypertension:  Continue home medications    -History of coronary artery disease:  Aspirin has been on hold for scheduled surgery    -Constipation  Colace has been ordered for on a scheduled basis. MiraLAX scheduled daily. Lactulose and Senokot ordered x 1 today on 3/13/2024. Patient has not had a bowel movement since Monday, 3/11/2024    Disposition  Patient will be discharged to SNF facility once pre-CERT has been obtained. Pre-CERT restarted as facility had to be changed.    I discussed advanced care planning including the explanation and discussion of advanced directives. If patient does not have current up to date documents, examples and information provided on how to create both living will and power of . Patient was encouraged to work on completing these documents.  Information and advise was also provided on DO NOT RESUSCITATE and patient encouraged to consider this  Patient is not sure about DNR at this time.  CODE STATUS is on file with the facility    Source of history: Nurse, Medical personnel, Medical record,  Patient.  History limitation: None.    HPI:  History and physical    Patient is unable to give any detailed history and therefore history is obtained from the chart  No acute complaints voiced by the patient or acute concerns raised by nursing    History of hospitalization-    Chief Complaint Back pain History of Present Illness This is a 78 years old lady with known severe canal stenosis L2-3 following up with Dr. Mccoy , patient was just discharged from rehab recently and from the hospital end of February to follow-up outpatient, she said after discharge she started having the back pain again and today it got really worse to the point that she was having difficulty ambulating secondary to the pain, she also started having radiation to the right side with numbness in both foot, no stool incontinence, patient was evaluated by neurosurgery in the ED and plan for surgery in the a.m.     Problem List/Past Medical History Ongoing Abnormal gait Acquired hypothyroidism Aortic valve stenosis Autoimmune interstitial lung disease, arthritis syndrome Brain TIA CAD (coronary artery disease) Cardiac murmur Cervical spondylosis without myelopathy Chronic low back pain Constipation Coronary arteriosclerosis COVID-19 Disorder of carotid artery FH: Alzheimer's disease Gastroesophageal reflux disease Generalized osteoarthritis History of left total knee replacement History of transient ischemic attack HLD (hyperlipidemia) HTN (hypertension) Hypertensive disorder Hypothyroid Hypothyroidism Intractable low back pain Low back pain Low income Lumbar back pain with radiculopathy affecting left lower extremity Lumbar radiculopathy Lumbosacral plexus lesion Morbid obesity Nodule of lung OAB (overactive bladder) Obesity Osteoarthritis of right hip joint Osteoarthritis of right knee joint Pain in right hip Peripheral vascular disease Presence of left artificial knee joint Rheumatoid arthritis of multiple joints Seropositive rheumatoid  arthritis Spinal stenosis of lumbar region Stercoral colitis Tension-type headache Urgent desire to urinate Vitamin D deficiency Procedure/Surgical History EXPLORE FUSION AND REMOVE INSTRUMENTATION L3-4; LAMINECTOMY AND INSTRUMENTED POSTERIOR FUSION L2-3: 06/05/23  Bladder Stimulator removed: 2023  LTKA: 2022  Laminectomy Lumbar fusion L3-5 Dr. Huff: 2011  Cath with PCI to prox-mid LAD: 2010  Cysto/urodynamic: 2006  Carotid Endarterectomy (R): 2003  TRISHA/BSO: 1992  C Section x3  Colonoscopy     Allergies Cymbalta shakes Social History   Alcohol Use:Current Frequency:1-2 times per week   Home/Environment Lives with:Alone *Living Situation Prior to AdmissionHome/Independent Home equipment:Walker/Cane Monitoring Equipment in homeNone *Special Services and Community Resources Prior to AdmissionNone *Mobility Assistance Prior to AdmissionIndependent Home BarriersExternal Stairs, Internal Stairs *Will patient require additional/new services upon discharge?No   Substance Abuse - Denies Substance Abuse   Tobacco Use:Never (less than 100 in lifetime) Family History Family history is negative    No current outpatient medications on file.     No current facility-administered medications for this visit.       Physical Exam:  Vital signs as per nursing/MA documentation were reviewed  General appearance: Alert and in no acute distress  HEENT: Normal Inspection  Neck - Normal Inspection  Respiratory : No respiratory distress. Lungs are clear   Cardiovascular: heart rate normal. No gallop  Back - normal inspection  Skin inspection:Warm  Musculoskeletal : No deformities  Neuro : Limited exam. Baseline    ROS: Review of symptoms is negative except for what is mentioned in HPI    Results/Data  Records including but not limited to electronic medical records, relevant lab work and imaging from patient's health care facility encounter were reviewed and independently verified      Charting was completed using voice recognition technology  and may include unintended errors.    Discussed with patient/family, RN, and NP.   [FreeTextEntry1] : cultures taken, discussed extending on a 20, trying nuvaring and extending, paraguard iud ( although for 6 months may not be worth it)\par She wants to try nuvaring\par Justina FREDERICK

## 2024-09-29 NOTE — PROGRESS NOTES
Subjective   Patient ID: Freedom Jose is a 79 y.o. female who presents for No chief complaint on file..  HPI  PT PRESENTS FOR EVALUATION OF INCREASING VOIDING DYSFUNCTION. PT UNRESPONSIVE TO VESICARE, DITROPAN AND TROSPIUM.  Are you experiencing:  Burning on urination -- NO  Pain on urination  -- NO  Urinary frequency -- YES -- EVERY 3 HOURS ON THE AVG  Urinary urgency -- YES  Urge incontinence -- YES  Urinary stress incontinence  -- NO  Number of pads used per day --  3 PER DAY ON AVG-- WET TO SOAKING WET  Eneuresis -- YES  Nocturia-- 5 X AT LEAST   Hematuria -- NO  Hesitancy -- NO  Post void fullness -- YES      Review of Systems  General-- No C/O fever or chills  Head-- No C/O Dizziness  Eyes-- NO  C/O blurry or double vision  Ears-- No C/O hearing loss  Neck-- Supple  Chest-- No C/O pain or discomfort  Lungs-- No C/O shortness of breath  Abdomen-- No C/O  pain or discomfort, No nausea or vomiting  Back-- PT C/O back pain AND discomfort -- H/O L-S DDD -- PT S/P 4 BACK SURGERIES   Extremities-- No C/O swelling or pain    Objective   Physical Exam    General-- well developed, well nourished in NAD  Head-- normal cephalic, atraumatic  Eyes-- PERRL, EOM'S FROM,  no  jaundice  Neck-- Supple, without masses  Chest-- Normal bony structure  Abdomen-- soft, non tender, liver spleen not palpable . No supra pubic masses  Back-- no flank masses palpable, no CVA tenderness on palpation or perc;ussion  Lymph nodes-- No inguinal lymphadenopathy noted  Extremities -- Normal muscle mass and tone for the patients age--UNSTEADY GAIT -- PT USES A CANE FOR STABILITY  Neurological-- oriented times three    Urinalysis dipstick--trace of ketones, small bilirubin otherwise within normal limits    PVR-- 12 ML  Assessment/Plan   A:  INCREASING URINARY URGE INCONTINENCE pathophysiology of the above as well as options of further  Therapy were discussed in detail  All questions were answered  H/O L-S DDD -- PT S/P 4 SURGERIES IN THE PAST  --this is most likely the cause of her current voiding symptoms    P:  PT TO SEE IF MYRBETRIQ AND OR GEMTESA ARE COVERED BY HER INSURANCE -- WILL CALL IN A SCRIPT FOR WHICHEVER IS COVERED   F/U IN 3 WEEKS to see how the patient is doing on the above therapy  If none of the above medications are covered then we will consider intravesical Botox as the next form of therapy    Salvador KELSEY Currie MD 09/29/24 5:13 PM

## 2024-10-02 ENCOUNTER — OFFICE VISIT (OUTPATIENT)
Dept: UROLOGY | Facility: CLINIC | Age: 79
End: 2024-10-02
Payer: MEDICARE

## 2024-10-02 VITALS — RESPIRATION RATE: 18 BRPM | WEIGHT: 175 LBS | HEIGHT: 59 IN | BODY MASS INDEX: 35.28 KG/M2

## 2024-10-02 DIAGNOSIS — N39.41 URGENCY INCONTINENCE: ICD-10-CM

## 2024-10-02 DIAGNOSIS — N39.44 ENURESIS, NOCTURNAL ONLY: ICD-10-CM

## 2024-10-02 DIAGNOSIS — R35.1 NOCTURIA: ICD-10-CM

## 2024-10-02 DIAGNOSIS — R39.15 URGENCY OF URINATION: ICD-10-CM

## 2024-10-02 DIAGNOSIS — R35.0 FREQUENCY OF MICTURITION: Primary | ICD-10-CM

## 2024-10-02 LAB
POC APPEARANCE, URINE: CLEAR
POC BILIRUBIN, URINE: ABNORMAL
POC BLOOD, URINE: NEGATIVE
POC COLOR, URINE: YELLOW
POC GLUCOSE, URINE: NEGATIVE MG/DL
POC KETONES, URINE: ABNORMAL MG/DL
POC LEUKOCYTES, URINE: NEGATIVE
POC NITRITE,URINE: NEGATIVE
POC PH, URINE: 5.5 PH
POC PROTEIN, URINE: NEGATIVE MG/DL
POC SPECIFIC GRAVITY, URINE: >=1.03
POC UROBILINOGEN, URINE: 0.2 EU/DL

## 2024-10-02 PROCEDURE — 99204 OFFICE O/P NEW MOD 45 MIN: CPT | Performed by: UROLOGY

## 2024-10-02 PROCEDURE — 1160F RVW MEDS BY RX/DR IN RCRD: CPT | Performed by: UROLOGY

## 2024-10-02 PROCEDURE — 99214 OFFICE O/P EST MOD 30 MIN: CPT | Performed by: UROLOGY

## 2024-10-02 PROCEDURE — 81003 URINALYSIS AUTO W/O SCOPE: CPT | Mod: QW | Performed by: UROLOGY

## 2024-10-02 PROCEDURE — 1125F AMNT PAIN NOTED PAIN PRSNT: CPT | Performed by: UROLOGY

## 2024-10-02 PROCEDURE — 1159F MED LIST DOCD IN RCRD: CPT | Performed by: UROLOGY

## 2024-10-02 PROCEDURE — 51798 US URINE CAPACITY MEASURE: CPT | Performed by: UROLOGY

## 2024-10-02 PROCEDURE — 1157F ADVNC CARE PLAN IN RCRD: CPT | Performed by: UROLOGY

## 2024-10-02 RX ORDER — LISINOPRIL 10 MG/1
10 TABLET ORAL
COMMUNITY
Start: 2024-06-03

## 2024-10-02 RX ORDER — PREGABALIN 150 MG/1
150 CAPSULE ORAL
COMMUNITY
Start: 2024-06-03

## 2024-10-02 RX ORDER — ASPIRIN 81 MG/1
81 TABLET ORAL
COMMUNITY
Start: 2023-08-23

## 2024-10-02 RX ORDER — LEVOTHYROXINE SODIUM 137 UG/1
137 TABLET ORAL DAILY
COMMUNITY

## 2024-10-02 RX ORDER — TRAZODONE HYDROCHLORIDE 100 MG/1
1 TABLET ORAL NIGHTLY
COMMUNITY
Start: 2024-01-11

## 2024-10-02 RX ORDER — AMOXICILLIN 250 MG
1 CAPSULE ORAL DAILY
COMMUNITY

## 2024-10-02 RX ORDER — ACETAMINOPHEN 500 MG
TABLET ORAL
COMMUNITY

## 2024-10-02 RX ORDER — MORPHINE SULFATE 15 MG/1
15 TABLET ORAL
COMMUNITY
Start: 2024-09-23 | End: 2024-10-23

## 2024-10-02 RX ORDER — BACLOFEN 20 MG
500 TABLET ORAL
COMMUNITY
Start: 2023-01-30

## 2024-10-02 RX ORDER — OXYCODONE AND ACETAMINOPHEN 5; 325 MG/1; MG/1
1 TABLET ORAL 2 TIMES DAILY
COMMUNITY

## 2024-10-02 ASSESSMENT — ENCOUNTER SYMPTOMS
DEPRESSION: 0
OCCASIONAL FEELINGS OF UNSTEADINESS: 1
LOSS OF SENSATION IN FEET: 1

## 2024-10-02 ASSESSMENT — PATIENT HEALTH QUESTIONNAIRE - PHQ9
1. LITTLE INTEREST OR PLEASURE IN DOING THINGS: NOT AT ALL
2. FEELING DOWN, DEPRESSED OR HOPELESS: NOT AT ALL
SUM OF ALL RESPONSES TO PHQ9 QUESTIONS 1 AND 2: 0

## 2024-10-02 ASSESSMENT — COLUMBIA-SUICIDE SEVERITY RATING SCALE - C-SSRS
2. HAVE YOU ACTUALLY HAD ANY THOUGHTS OF KILLING YOURSELF?: NO
6. HAVE YOU EVER DONE ANYTHING, STARTED TO DO ANYTHING, OR PREPARED TO DO ANYTHING TO END YOUR LIFE?: NO
1. IN THE PAST MONTH, HAVE YOU WISHED YOU WERE DEAD OR WISHED YOU COULD GO TO SLEEP AND NOT WAKE UP?: NO

## 2024-10-02 ASSESSMENT — PAIN SCALES - GENERAL: PAINLEVEL: 7

## 2024-10-02 NOTE — LETTER
October 4, 2024     Amrita Andre MD  [address]  [facility]  [place] [address]    Patient: Freedom Jose   YOB: 1945   Date of Visit: 10/2/2024       Dear Dr. Amrita Andre MD:    Thank you for referring Freedom Jose to me for evaluation. Below are my notes for this consultation.  If you have questions, please do not hesitate to call me. I look forward to following your patient along with you.       Sincerely,     Salvador Currie MD      CC: No Recipients  ______________________________________________________________________________________    Subjective  Patient ID: Freedom Jose is a 79 y.o. female who presents for No chief complaint on file..  HPI  PT PRESENTS FOR EVALUATION OF INCREASING VOIDING DYSFUNCTION. PT UNRESPONSIVE TO VESICARE, DITROPAN AND TROSPIUM.  Are you experiencing:  Burning on urination -- NO  Pain on urination  -- NO  Urinary frequency -- YES -- EVERY 3 HOURS ON THE AVG  Urinary urgency -- YES  Urge incontinence -- YES  Urinary stress incontinence  -- NO  Number of pads used per day --  3 PER DAY ON AVG-- WET TO SOAKING WET  Eneuresis -- YES  Nocturia-- 5 X AT LEAST   Hematuria -- NO  Hesitancy -- NO  Post void fullness -- YES      Review of Systems  General-- No C/O fever or chills  Head-- No C/O Dizziness  Eyes-- NO  C/O blurry or double vision  Ears-- No C/O hearing loss  Neck-- Supple  Chest-- No C/O pain or discomfort  Lungs-- No C/O shortness of breath  Abdomen-- No C/O  pain or discomfort, No nausea or vomiting  Back-- PT C/O back pain AND discomfort -- H/O L-S DDD -- PT S/P 4 BACK SURGERIES   Extremities-- No C/O swelling or pain    Objective   Physical Exam    General-- well developed, well nourished in NAD  Head-- normal cephalic, atraumatic  Eyes-- PERRL, EOM'S FROM,  no  jaundice  Neck-- Supple, without masses  Chest-- Normal bony structure  Abdomen-- soft, non tender, liver spleen not palpable . No supra pubic masses  Back-- no flank  masses palpable, no CVA tenderness on palpation or perc;ussion  Lymph nodes-- No inguinal lymphadenopathy noted  Extremities -- Normal muscle mass and tone for the patients age--UNSTEADY GAIT -- PT USES A CANE FOR STABILITY  Neurological-- oriented times three    Urinalysis dipstick--trace of ketones, small bilirubin otherwise within normal limits    PVR-- 12 ML  Assessment/Plan   A:  INCREASING URINARY URGE INCONTINENCE pathophysiology of the above as well as options of further  Therapy were discussed in detail  All questions were answered  H/O L-S DDD -- PT S/P 4 SURGERIES IN THE PAST --this is most likely the cause of her current voiding symptoms    P:  PT TO SEE IF MYRBETRIQ AND OR GEMTESA ARE COVERED BY HER INSURANCE -- WILL CALL IN A SCRIPT FOR WHICHEVER IS COVERED   F/U IN 3 WEEKS to see how the patient is doing on the above therapy  If none of the above medications are covered then we will consider intravesical Botox as the next form of therapy    Salvador Currie MD 09/29/24 5:13 PM

## 2024-10-18 DIAGNOSIS — N39.41 URGENCY INCONTINENCE: ICD-10-CM

## 2024-10-21 DIAGNOSIS — N39.41 URGE INCONTINENCE: Primary | ICD-10-CM

## 2024-10-21 RX ORDER — VIBEGRON 75 MG/1
75 TABLET, FILM COATED ORAL DAILY
Qty: 30 TABLET | Refills: 11 | Status: SHIPPED | OUTPATIENT
Start: 2024-10-21 | End: 2025-10-21

## 2024-10-30 ENCOUNTER — APPOINTMENT (OUTPATIENT)
Dept: UROLOGY | Facility: CLINIC | Age: 79
End: 2024-10-30
Payer: MEDICARE

## 2024-11-24 NOTE — PROGRESS NOTES
Subjective   Patient ID: Freedom Jose is a 79 y.o. female who presents for    A:  INCREASING URINARY URGE INCONTINENCE pathophysiology of the above as well as options of further  Therapy were discussed in detail  All questions were answered  H/O L-S DDD -- PT S/P 4 SURGERIES IN THE PAST --this is most likely the cause of her current voiding symptoms     P:  PT TO SEE IF MYRBETRIQ AND OR GEMTESA ARE COVERED BY HER INSURANCE -- WILL CALL IN A SCRIPT FOR WHICHEVER IS COVERED   F/U IN 3 WEEKS to see how the patient is doing on the above therapy  Salvador Currie MD 11/24/24 12:56 PM

## 2024-11-27 ENCOUNTER — APPOINTMENT (OUTPATIENT)
Dept: UROLOGY | Facility: CLINIC | Age: 79
End: 2024-11-27
Payer: MEDICARE

## 2024-11-27 DIAGNOSIS — N39.41 URGE INCONTINENCE OF URINE: ICD-10-CM

## 2024-12-08 NOTE — PROGRESS NOTES
Subjective   Patient ID: Freedom Jose is a 79 y.o. female who presents FOR A F/U ON HER VOIDING DYSFUNCTION.  PT IS ON GEMTESA 75 MG / DAY.  THE MEDICATION HAS NOT MADE ANY SIGNIFICANT DIFFERENCE IN HER QUALITY OF LIFE SO FAR.  HPI  Are you experiencing:  Burning on urination -- NO  Pain on urination  -- NO  Urinary frequency -- EVERY 2 HOURS  Urinary urgency -- YES  Urge incontinence -- YES  Urinary stress incontinence  -- NO  Number of pads used per day --3 / DAY -- WET TO SOAKING WET  Eneuresis -- NO  Nocturia-- 5 X / NIGHT ON AVG  Hematuria -- NO      URINALYSIS DIPSTICK-- TRACE OF KETONES     Assessment/Plan   A:  INCREASING URINARY URGE INCONTINENCE  PT  HAS FAILED TROSPIUM , OXYBUTYNIN, VESICARE , MYRBETRIQ AND GEMTESA  OPTIONS OF FURTHER THERAPY DISCUSSED  WITH THE PT AND HER DAUGHTER-IN LAW ( WHO IS A NURSE )   ALL QUESTIONS ANSWERED     H/O L-S DDD - AND SPINAL STENOSIS - PT S/P 4 SURGERIES IN THE PAST -THIS IS MOST LIKELY THE CAUSE OF HER URGE INCONTINENCE.    P:  PRE-CERT OF INTRAVESICAL BOTOX, 100 UNITS, OUT PT, MAC ANES  WILL SCHEDULE FOR THE FIRST WEEK IN APRIL Currie MD 12/08/24 12:57 PM

## 2024-12-09 ENCOUNTER — OFFICE VISIT (OUTPATIENT)
Dept: UROLOGY | Facility: CLINIC | Age: 79
End: 2024-12-09
Payer: MEDICARE

## 2024-12-09 VITALS
SYSTOLIC BLOOD PRESSURE: 105 MMHG | DIASTOLIC BLOOD PRESSURE: 77 MMHG | WEIGHT: 190 LBS | RESPIRATION RATE: 16 BRPM | TEMPERATURE: 98.6 F | BODY MASS INDEX: 38.3 KG/M2 | OXYGEN SATURATION: 97 % | HEART RATE: 76 BPM | HEIGHT: 59 IN

## 2024-12-09 DIAGNOSIS — R35.1 NOCTURIA: ICD-10-CM

## 2024-12-09 DIAGNOSIS — R32 URINARY INCONTINENCE, UNSPECIFIED TYPE: ICD-10-CM

## 2024-12-09 DIAGNOSIS — R39.15 URGENCY OF MICTURITION: ICD-10-CM

## 2024-12-09 DIAGNOSIS — N39.41 URGENCY INCONTINENCE: Primary | ICD-10-CM

## 2024-12-09 DIAGNOSIS — R35.0 FREQUENCY OF MICTURITION: ICD-10-CM

## 2024-12-09 LAB
POC APPEARANCE, URINE: CLEAR
POC BILIRUBIN, URINE: NEGATIVE
POC BLOOD, URINE: NEGATIVE
POC COLOR, URINE: YELLOW
POC GLUCOSE, URINE: NEGATIVE MG/DL
POC KETONES, URINE: ABNORMAL MG/DL
POC LEUKOCYTES, URINE: NEGATIVE
POC NITRITE,URINE: NEGATIVE
POC PH, URINE: 5.5 PH
POC PROTEIN, URINE: NEGATIVE MG/DL
POC SPECIFIC GRAVITY, URINE: >=1.03
POC UROBILINOGEN, URINE: 0.2 EU/DL

## 2024-12-09 PROCEDURE — 1160F RVW MEDS BY RX/DR IN RCRD: CPT | Performed by: UROLOGY

## 2024-12-09 PROCEDURE — 1157F ADVNC CARE PLAN IN RCRD: CPT | Performed by: UROLOGY

## 2024-12-09 PROCEDURE — 3078F DIAST BP <80 MM HG: CPT | Performed by: UROLOGY

## 2024-12-09 PROCEDURE — 99212 OFFICE O/P EST SF 10 MIN: CPT | Performed by: UROLOGY

## 2024-12-09 PROCEDURE — 1159F MED LIST DOCD IN RCRD: CPT | Performed by: UROLOGY

## 2024-12-09 PROCEDURE — 3074F SYST BP LT 130 MM HG: CPT | Performed by: UROLOGY

## 2024-12-09 PROCEDURE — 81003 URINALYSIS AUTO W/O SCOPE: CPT | Mod: QW | Performed by: UROLOGY

## 2024-12-09 SDOH — ECONOMIC STABILITY: FOOD INSECURITY: WITHIN THE PAST 12 MONTHS, YOU WORRIED THAT YOUR FOOD WOULD RUN OUT BEFORE YOU GOT MONEY TO BUY MORE.: NEVER TRUE

## 2024-12-09 SDOH — ECONOMIC STABILITY: FOOD INSECURITY: WITHIN THE PAST 12 MONTHS, THE FOOD YOU BOUGHT JUST DIDN'T LAST AND YOU DIDN'T HAVE MONEY TO GET MORE.: NEVER TRUE

## 2024-12-09 ASSESSMENT — ENCOUNTER SYMPTOMS
LOSS OF SENSATION IN FEET: 1
OCCASIONAL FEELINGS OF UNSTEADINESS: 1
DEPRESSION: 1

## 2024-12-09 NOTE — LETTER
December 13, 2024     Amrita Andre MD  [address]  [facility], [address]  [facility] [address]    Patient: Freedom Jose   YOB: 1945   Date of Visit: 12/9/2024       Dear Dr. Amrita Andre MD:    Thank you for referring Freedom Jose to me for evaluation. Below are my notes for this consultation.  If you have questions, please do not hesitate to call me. I look forward to following your patient along with you.       Sincerely,     Salvador Currie MD      CC: No Recipients  ______________________________________________________________________________________    Subjective  Patient ID: Freedom Jose is a 79 y.o. female who presents FOR A F/U ON HER VOIDING DYSFUNCTION.  PT IS ON GEMTESA 75 MG / DAY.  THE MEDICATION HAS NOT MADE ANY SIGNIFICANT DIFFERENCE IN HER QUALITY OF LIFE SO FAR.  HPI  Are you experiencing:  Burning on urination -- NO  Pain on urination  -- NO  Urinary frequency -- EVERY 2 HOURS  Urinary urgency -- YES  Urge incontinence -- YES  Urinary stress incontinence  -- NO  Number of pads used per day --3 / DAY -- WET TO SOAKING WET  Eneuresis -- NO  Nocturia-- 5 X / NIGHT ON AVG  Hematuria -- NO      URINALYSIS DIPSTICK-- TRACE OF KETONES     Assessment/Plan   A:  INCREASING URINARY URGE INCONTINENCE  PT  HAS FAILED TROSPIUM , OXYBUTYNIN, VESICARE , MYRBETRIQ AND GEMTESA  OPTIONS OF FURTHER THERAPY DISCUSSED  WITH THE PT AND HER DAUGHTER-IN LAW ( WHO IS A NURSE )   ALL QUESTIONS ANSWERED     H/O L-S DDD - AND SPINAL STENOSIS - PT S/P 4 SURGERIES IN THE PAST -THIS IS MOST LIKELY THE CAUSE OF HER URGE INCONTINENCE.    P:  PRE-CERT OF INTRAVESICAL BOTOX, 100 UNITS, OUT PT, MINAL BUENO  WILL SCHEDULE FOR THE FIRST WEEK IN JAN    Salvador Currie MD 12/08/24 12:57 PM

## 2024-12-20 ENCOUNTER — PREP FOR PROCEDURE (OUTPATIENT)
Dept: UROLOGY | Facility: CLINIC | Age: 79
End: 2024-12-20
Payer: MEDICARE

## 2024-12-20 DIAGNOSIS — N39.41 URGE INCONTINENCE: Primary | ICD-10-CM

## 2024-12-20 RX ORDER — CEFAZOLIN SODIUM 2 G/100ML
2 INJECTION, SOLUTION INTRAVENOUS ONCE
OUTPATIENT
Start: 2024-12-20 | End: 2024-12-20

## 2024-12-20 NOTE — H&P
History Of Present Illness  Freedom Jose is a 79 y.o. female presenting with urinary urge incontinence.     Past Medical History  She has no past medical history on file.    Surgical History  She has no past surgical history on file.     Social History  She reports that she has never smoked. She has never used smokeless tobacco. She reports that she does not drink alcohol and does not use drugs.    Family History  Family History   Problem Relation Name Age of Onset    Hypertension Father      Heart disease Father      Heart disease Mother      Hypertension Mother          Allergies  Celecoxib, Duloxetine, and Other    Review of Systems     Physical Exam     Last Recorded Vitals  There were no vitals taken for this visit.    Relevant Results    No results found for this or any previous visit (from the past 24 hours).    No results found.         Assessment/Plan     A:  PT HAS URINARY URGE INCONTINENCE, UNRESPONSIVE TO ORAL MEDICATIONS.    P:  PT WOULD LIKE TO PROCEED WITH A CYSTO, INTRAVESICAL INJECTION  UNITS OF BOTOX.       I spent 20 minutes in the professional and overall care of this patient.      Salvador Currie MD

## 2024-12-31 ENCOUNTER — PRE-ADMISSION TESTING (OUTPATIENT)
Dept: PREADMISSION TESTING | Facility: HOSPITAL | Age: 79
End: 2024-12-31
Payer: MEDICARE

## 2024-12-31 VITALS
RESPIRATION RATE: 20 BRPM | SYSTOLIC BLOOD PRESSURE: 133 MMHG | BODY MASS INDEX: 36.89 KG/M2 | HEART RATE: 67 BPM | WEIGHT: 183 LBS | TEMPERATURE: 96.8 F | HEIGHT: 59 IN | DIASTOLIC BLOOD PRESSURE: 59 MMHG | OXYGEN SATURATION: 99 %

## 2024-12-31 DIAGNOSIS — Z01.818 PREOP TESTING: Primary | ICD-10-CM

## 2024-12-31 DIAGNOSIS — N39.41 URGE INCONTINENCE: ICD-10-CM

## 2024-12-31 LAB
APPEARANCE UR: ABNORMAL
BACTERIA #/AREA URNS AUTO: ABNORMAL /HPF
BILIRUB UR STRIP.AUTO-MCNC: NEGATIVE MG/DL
COLOR UR: YELLOW
GLUCOSE UR STRIP.AUTO-MCNC: NORMAL MG/DL
HOLD SPECIMEN: NORMAL
HYALINE CASTS #/AREA URNS AUTO: ABNORMAL /LPF
KETONES UR STRIP.AUTO-MCNC: NEGATIVE MG/DL
LEUKOCYTE ESTERASE UR QL STRIP.AUTO: ABNORMAL
MUCOUS THREADS #/AREA URNS AUTO: ABNORMAL /LPF
NITRITE UR QL STRIP.AUTO: NEGATIVE
PH UR STRIP.AUTO: 5.5 [PH]
PROT UR STRIP.AUTO-MCNC: ABNORMAL MG/DL
RBC # UR STRIP.AUTO: NEGATIVE /UL
RBC #/AREA URNS AUTO: ABNORMAL /HPF
SP GR UR STRIP.AUTO: 1.03
SQUAMOUS #/AREA URNS AUTO: ABNORMAL /HPF
UROBILINOGEN UR STRIP.AUTO-MCNC: NORMAL MG/DL
WBC #/AREA URNS AUTO: >50 /HPF

## 2024-12-31 PROCEDURE — 99204 OFFICE O/P NEW MOD 45 MIN: CPT

## 2024-12-31 PROCEDURE — 87186 SC STD MICRODIL/AGAR DIL: CPT | Mod: PARLAB

## 2024-12-31 PROCEDURE — 81001 URINALYSIS AUTO W/SCOPE: CPT

## 2024-12-31 PROCEDURE — 87086 URINE CULTURE/COLONY COUNT: CPT | Mod: PARLAB

## 2024-12-31 PROCEDURE — 93005 ELECTROCARDIOGRAM TRACING: CPT

## 2024-12-31 RX ORDER — BUPROPION HYDROCHLORIDE 150 MG/1
150 TABLET ORAL DAILY
COMMUNITY

## 2024-12-31 RX ORDER — BISMUTH SUBSALICYLATE 262 MG
1 TABLET,CHEWABLE ORAL DAILY
COMMUNITY

## 2024-12-31 RX ORDER — MORPHINE SULFATE 15 MG/1
15 TABLET, FILM COATED, EXTENDED RELEASE ORAL 3 TIMES DAILY
COMMUNITY

## 2024-12-31 ASSESSMENT — PAIN SCALES - GENERAL: PAINLEVEL_OUTOF10: 0 - NO PAIN

## 2024-12-31 ASSESSMENT — DUKE ACTIVITY SCORE INDEX (DASI)
CAN YOU PARTICIPATE IN MODERATE RECREATIONAL ACTIVITIES LIKE GOLF, BOWLING, DANCING, DOUBLES TENNIS OR THROWING A BASEBALL OR FOOTBALL: NO
DASI METS SCORE: 2.7
CAN YOU DO HEAVY WORK AROUND THE HOUSE LIKE SCRUBBING FLOORS OR LIFTING AND MOVING HEAVY FURNITURE: NO
CAN YOU HAVE SEXUAL RELATIONS: NO
CAN YOU PARTICIPATE IN STRENOUS SPORTS LIKE SWIMMING, SINGLES TENNIS, FOOTBALL, BASKETBALL, OR SKIING: NO
CAN YOU RUN A SHORT DISTANCE: NO
CAN YOU CLIMB A FLIGHT OF STAIRS OR WALK UP A HILL: NO
CAN YOU DO LIGHT WORK AROUND THE HOUSE LIKE DUSTING OR WASHING DISHES: NO
CAN YOU DO MODERATE WORK AROUND THE HOUSE LIKE VACUUMING, SWEEPING FLOORS OR CARRYING GROCERIES: NO
CAN YOU WALK A BLOCK OR TWO ON LEVEL GROUND: NO
TOTAL_SCORE: 0
CAN YOU TAKE CARE OF YOURSELF (EAT, DRESS, BATHE, OR USE TOILET): NO
CAN YOU DO YARD WORK LIKE RAKING LEAVES, WEEDING OR PUSHING A MOWER: NO
CAN YOU WALK INDOORS, SUCH AS AROUND YOUR HOUSE: NO

## 2024-12-31 ASSESSMENT — PAIN - FUNCTIONAL ASSESSMENT: PAIN_FUNCTIONAL_ASSESSMENT: 0-10

## 2024-12-31 NOTE — H&P (VIEW-ONLY)
CPM/PAT Evaluation       Name: Freedom Jose (Freedom Jose)  /Age: 1945/ y.o.     Visit Type:   In-Person       Chief Complaint: Worsening incontinence requiring intervention    HPI  Patient is a 79-year-old female with a history of HTN, HLD, CAD s/p PCI x 2, PAD s/p right carotid endarterectomy, aortic valve stenosis, anemia, TIA, hypothyroidism, rheumatoid arthritis, GERD, obesity and overactive bladder who presents today for preoperative evaluation.  Patient follows with Dr. Currie for urge incontinence and is scheduled for cystoscopy with botulinum toxin injection on 2025 under sedation. Patient denies recent illness, fever, chills, fatigue, cough, shortness of breath, chest pain, lower extremity edema, urinary or GI symptoms.   She does note that she has swelling to her right eyelid since having a skin lesion treated near her right eye. Denies effects on the vision.    Past Medical History:   Diagnosis Date    Aortic valve stenosis     CAD (coronary artery disease)     s/p PCI x2    GERD (gastroesophageal reflux disease)     HLD (hyperlipidemia)     HTN (hypertension)     Hypothyroid     Lung nodules     OAB (overactive bladder)     Obesity     PAD (peripheral artery disease) (CMS-[facility])     s/p R carotid endartarectomy    RA (rheumatoid arthritis)     TIA (transient ischemic attack)        Past Surgical History:   Procedure Laterality Date    ANKLE SURGERY Left     BACK SURGERY      x4 lumbar region     SECTION, CLASSIC      HYSTERECTOMY         Patient  has no history on file for sexual activity.    Family History   Problem Relation Name Age of Onset    Hypertension Father      Heart disease Father      Heart disease Mother      Hypertension Mother         Allergies   Allergen Reactions    Celecoxib Unknown and Nausea/vomiting    Duloxetine Other and Unknown    Other Unknown       Prior to Admission medications    Medication Sig Start Date End Date Taking? Authorizing Provider    acetaminophen (Tylenol Extra Strength) 500 mg tablet Take by mouth.   Yes Historical Provider, MD   aspirin 81 mg EC tablet 1 tablet (81 mg). 8/23/23  Yes Historical Provider, MD   buPROPion XL (Wellbutrin XL) 150 mg 24 hr tablet Take 1 tablet (150 mg) by mouth once daily. Do not crush, chew, or split.   Yes Historical Provider, MD   cholecalciferol (Vitamin D-3) 50 mcg (2,000 unit) capsule    Yes Historical Provider, MD   levothyroxine (Synthroid, Levoxyl) 137 mcg tablet Take 1 tablet (137 mcg) by mouth once daily.   Yes Historical Provider, MD   lisinopril (ZestriL) 10 mg tablet 1 tablet (10 mg). 6/3/24  Yes Historical Provider, MD   magnesium oxide 500 mg magnesium tablet Take 1 tablet (500 mg) by mouth once daily. 1/30/23  Yes Historical Provider, MD   morphine CR (MS Contin) 15 mg 12 hr tablet Take 1 tablet (15 mg) by mouth 3 times a day. Do not crush, chew, or split.   Yes Historical Provider, MD   multivitamin tablet Take 1 tablet by mouth once daily.   Yes Historical Provider, MD   oxyCODONE-acetaminophen (Percocet) 5-325 mg tablet Take 1 tablet by mouth 2 times a day.   Yes Historical Provider, MD   pregabalin (Lyrica) 150 mg capsule 1 capsule (150 mg). 6/3/24  Yes Historical Provider, MD   sennosides-docusate sodium (Michaela-Colace) 8.6-50 mg tablet Take 1 tablet by mouth once daily.   Yes Historical Provider, MD   traZODone (Desyrel) 100 mg tablet Take 1 tablet (100 mg) by mouth once daily at bedtime. 1/11/24  Yes Historical Provider, MD   upadacitinib ER (Rinvoq) 15 mg tablet extended release 24 hr 1 tablet (15 mg). 6/3/24  Yes Historical Provider, MD   vibegron (Gemtesa) 75 mg tablet Take 1 tablet (75 mg) by mouth early in the morning.. 10/21/24 10/21/25  Salvador Currie MD   vibegron 75 mg tablet Take 1 tablet (75 mg) by mouth once daily. 10/20/24   Salvador Currie MD        A ten-point review of systems was completed and is otherwise negative except for what is mentioned in the HPI above.    Physical  "Exam:    GENERAL: Well developed, nearly wheelchair bound, awake/alert/oriented x3, no distress, alert and cooperative, pleasant   HEENT: MMM  NECK: Trachea midline, no lymphadenopathy  CARDIOVASCULAR: RRR, normal S1 and S 2, systolic murmurs, 2+ equal pulses of the extremities  RESPIRATORY: Patent airways, CTAB, normal breath sounds with good chest expansion, thorax symmetric  ABDOMEN: Soft, non-tender, no distention  SKIN: Warm and dry  EXTREMITIES: No cyanosis, edema  NEURO: A&O x 3, normal motor and sensation, no focal deficits   PSYCH: Appropriate mood and behavior      PAT AIRWAY:   Airway:     Mallampati::  I    TM distance::  >3 FB    Neck ROM::  Full  normal          Visit Vitals  /59   Pulse 67   Temp 36 °C (96.8 °F) (Temporal)   Resp 20   Ht 1.499 m (4' 11\")   Wt 83 kg (183 lb)   SpO2 99%   BMI 36.96 kg/m²   Smoking Status Never   BSA 1.86 m²       DASI Risk Score      Flowsheet Row Pre-Admission Testing from 12/31/2024 in [facility]   Can you take care of yourself (eat, dress, bathe, or use toilet)?  0 filed at 12/31/2024 1210   Can you walk indoors, such as around your house? 0 filed at 12/31/2024 1210   Can you walk a block or two on level ground?  0 filed at 12/31/2024 1210   Can you climb a flight of stairs or walk up a hill? 0 filed at 12/31/2024 1210   Can you run a short distance? 0 filed at 12/31/2024 1210   Can you do light work around the house like dusting or washing dishes? 0 filed at 12/31/2024 1210   Can you do moderate work around the house like vacuuming, sweeping floors or carrying groceries? 0 filed at 12/31/2024 1210   Can you do heavy work around the house like scrubbing floors or lifting and moving heavy furniture?  0 filed at 12/31/2024 1210   Can you do yard work like raking leaves, weeding or pushing a mower? 0 filed at 12/31/2024 1210   Can you have sexual relations? 0 filed at 12/31/2024 1210   Can you participate in moderate recreational activities like golf, " bowling, dancing, doubles tennis or throwing a baseball or football? 0 filed at 12/31/2024 1210   Can you participate in strenous sports like swimming, singles tennis, football, basketball, or skiing? 0 filed at 12/31/2024 1210   DASI SCORE 0 filed at 12/31/2024 1210   METS Score (Will be calculated only when all the questions are answered) 2.7 filed at 12/31/2024 1210          Caprini DVT Assessment    No data to display       Modified Frailty Index    No data to display       CHADS2 Stroke Risk  Current as of 57 minutes ago        N/A 3 to 100%: High Risk   2 to < 3%: Medium Risk   0 to < 2%: Low Risk     Last Change: N/A          This score determines the patient's risk of having a stroke if the patient has atrial fibrillation.        This score is not applicable to this patient. Components are not calculated.          Revised Cardiac Risk Index    No data to display       Apfel Simplified Score    No data to display       Risk Analysis Index Results This Encounter    No data found in the last 10 encounters.       Stop Bang Score      Flowsheet Row Pre-Admission Testing from 12/31/2024 in [facility]   Do you snore loudly? 1 filed at 12/31/2024 1210   Do you often feel tired or fatigued after your sleep? 0 filed at 12/31/2024 1210   Has anyone ever observed you stop breathing in your sleep? 0 filed at 12/31/2024 1210   Do you have or are you being treated for high blood pressure? 1 filed at 12/31/2024 1210   Recent BMI (Calculated) 36.9 filed at 12/31/2024 1210   Is BMI greater than 35 kg/m2? 1=Yes filed at 12/31/2024 1210   Age older than 50 years old? 1=Yes filed at 12/31/2024 1210   Is your neck circumference greater than 17 inches (Male) or 16 inches (Female)? 1 filed at 12/31/2024 1210   Gender - Male 0=No filed at 12/31/2024 1210   STOP-BANG Total Score 5 filed at 12/31/2024 1210          Prodigy: High Risk  Total Score: 15              Prodigy Age Score      Prodigy Previous Opioid Use Score            ARISCAT Score for Postoperative Pulmonary Complications    No data to display       Rg Perioperative Risk for Myocardial Infarction or Cardiac Arrest (SELVIN)    No data to display         Assessment and Plan:   Patient is a 79-year-old female with a history of HTN, HLD, CAD s/p PCI x 2, PAD s/p right carotid endarterectomy, aortic valve stenosis, anemia, TIA, hypothyroidism, rheumatoid arthritis, GERD, obesity and overactive bladder .    #Urge incontinence  #Overactive bladder  -Scheduled for cystoscopy with botulinum toxin injection on 1/7/2025 under sedation with Dr. Currie  -Currently maintained on Vibegron    #HTN, Hx  #HLD, Hx  #CAD s/p PCI, Hx  #PAD s/p right CEA  #Aortic valve stenosis  #TIA, Hx  -Follows with Dr. Kenny last seen September 2023  -5/11/2023 stress test was normal  -EKG obtained today demonstrates normal sinus rhythm with first-degree AV block and right bundle branch block  -Currently maintained on baby aspirin and lisinopril    #Anemia, Hx  CBC from 11/19/2024 shows hemoglobin 9.9 -this is thought to be related to Rinvoq     #Hypothyroidism, Hx  Current maintained on levothyroxine  TSH from 11/19/2024 within normal range    #GERD, Hx  No current issues with diet and lifestyle controlled    #Rheumatoid arthritis, Hx  Currently maintained on Rinvoq    Labs from 11/19/24 reviewed and grossly unremarkable aside from Hg 9.9 (CBC, BMP, TSH, Lipid panel, Mg)  EKG obtained today and demonstrates NSR with 1st degree AV block and RBBB  UA obtained today and pending    I spent 45 minutes in the professional and overall care of this patient. Greater than 50% of this time was spent counseling patient, reviewing plan of care and discussing medication perioperative management.    Dang Phelan, APRN-CNP

## 2024-12-31 NOTE — CPM/PAT H&P
CPM/PAT Evaluation       Name: Freedom Jose (Freedom Jose)  /Age: 1945/ y.o.     Visit Type:   In-Person       Chief Complaint: Worsening incontinence requiring intervention    HPI  Patient is a 79-year-old female with a history of HTN, HLD, CAD s/p PCI x 2, PAD s/p right carotid endarterectomy, aortic valve stenosis, anemia, TIA, hypothyroidism, rheumatoid arthritis, GERD, obesity and overactive bladder who presents today for preoperative evaluation.  Patient follows with Dr. Currie for urge incontinence and is scheduled for cystoscopy with botulinum toxin injection on 2025 under sedation. Patient denies recent illness, fever, chills, fatigue, cough, shortness of breath, chest pain, lower extremity edema, urinary or GI symptoms.   She does note that she has swelling to her right eyelid since having a skin lesion treated near her right eye. Denies effects on the vision.    Past Medical History:   Diagnosis Date    Aortic valve stenosis     CAD (coronary artery disease)     s/p PCI x2    GERD (gastroesophageal reflux disease)     HLD (hyperlipidemia)     HTN (hypertension)     Hypothyroid     Lung nodules     OAB (overactive bladder)     Obesity     PAD (peripheral artery disease) ([facility])     s/p R carotid endartarectomy    RA (rheumatoid arthritis)     TIA (transient ischemic attack)        Past Surgical History:   Procedure Laterality Date    ANKLE SURGERY Left     BACK SURGERY      x4 lumbar region     SECTION, CLASSIC      HYSTERECTOMY         Patient  has no history on file for sexual activity.    Family History   Problem Relation Name Age of Onset    Hypertension Father      Heart disease Father      Heart disease Mother      Hypertension Mother         Allergies   Allergen Reactions    Celecoxib Unknown and Nausea/vomiting    Duloxetine Other and Unknown    Other Unknown       Prior to Admission medications    Medication Sig Start Date End Date Taking? Authorizing Provider    acetaminophen (Tylenol Extra Strength) 500 mg tablet Take by mouth.   Yes Historical Provider, MD   aspirin 81 mg EC tablet 1 tablet (81 mg). 8/23/23  Yes Historical Provider, MD   buPROPion XL (Wellbutrin XL) 150 mg 24 hr tablet Take 1 tablet (150 mg) by mouth once daily. Do not crush, chew, or split.   Yes Historical Provider, MD   cholecalciferol (Vitamin D-3) 50 mcg (2,000 unit) capsule    Yes Historical Provider, MD   levothyroxine (Synthroid, Levoxyl) 137 mcg tablet Take 1 tablet (137 mcg) by mouth once daily.   Yes Historical Provider, MD   lisinopril (ZestriL) 10 mg tablet 1 tablet (10 mg). 6/3/24  Yes Historical Provider, MD   magnesium oxide 500 mg magnesium tablet Take 1 tablet (500 mg) by mouth once daily. 1/30/23  Yes Historical Provider, MD   morphine CR (MS Contin) 15 mg 12 hr tablet Take 1 tablet (15 mg) by mouth 3 times a day. Do not crush, chew, or split.   Yes Historical Provider, MD   multivitamin tablet Take 1 tablet by mouth once daily.   Yes Historical Provider, MD   oxyCODONE-acetaminophen (Percocet) 5-325 mg tablet Take 1 tablet by mouth 2 times a day.   Yes Historical Provider, MD   pregabalin (Lyrica) 150 mg capsule 1 capsule (150 mg). 6/3/24  Yes Historical Provider, MD   sennosides-docusate sodium (Michaela-Colace) 8.6-50 mg tablet Take 1 tablet by mouth once daily.   Yes Historical Provider, MD   traZODone (Desyrel) 100 mg tablet Take 1 tablet (100 mg) by mouth once daily at bedtime. 1/11/24  Yes Historical Provider, MD   upadacitinib ER (Rinvoq) 15 mg tablet extended release 24 hr 1 tablet (15 mg). 6/3/24  Yes Historical Provider, MD   vibegron (Gemtesa) 75 mg tablet Take 1 tablet (75 mg) by mouth early in the morning.. 10/21/24 10/21/25  Salvador Currie MD   vibegron 75 mg tablet Take 1 tablet (75 mg) by mouth once daily. 10/20/24   Salvador Currie MD        A ten-point review of systems was completed and is otherwise negative except for what is mentioned in the HPI above.    Physical  "Exam:    GENERAL: Well developed, nearly wheelchair bound, awake/alert/oriented x3, no distress, alert and cooperative, pleasant   HEENT: MMM  NECK: Trachea midline, no lymphadenopathy  CARDIOVASCULAR: RRR, normal S1 and S 2, systolic murmurs, 2+ equal pulses of the extremities  RESPIRATORY: Patent airways, CTAB, normal breath sounds with good chest expansion, thorax symmetric  ABDOMEN: Soft, non-tender, no distention  SKIN: Warm and dry  EXTREMITIES: No cyanosis, edema  NEURO: A&O x 3, normal motor and sensation, no focal deficits   PSYCH: Appropriate mood and behavior      PAT AIRWAY:   Airway:     Mallampati::  I    TM distance::  >3 FB    Neck ROM::  Full  normal          Visit Vitals  /59   Pulse 67   Temp 36 °C (96.8 °F) (Temporal)   Resp 20   Ht 1.499 m (4' 11\")   Wt 83 kg (183 lb)   SpO2 99%   BMI 36.96 kg/m²   Smoking Status Never   BSA 1.86 m²       DASI Risk Score      Flowsheet Row Pre-Admission Testing [facility]/31/[facility]   Can you take care of yourself (eat, dress, bathe, or use toilet)?  0 filed at 12/31/2024 1210   Can you walk indoors, such as around your house? 0 filed at 12/31/2024 1210   Can you walk a block or two on level ground?  0 filed at 12/31/2024 1210   Can you climb a flight of stairs or walk up a hill? 0 filed at 12/31/2024 1210   Can you run a short distance? 0 filed at 12/31/2024 1210   Can you do light work around the house like dusting or washing dishes? 0 filed at 12/31/2024 1210   Can you do moderate work around the house like vacuuming, sweeping floors or carrying groceries? 0 filed at 12/31/2024 1210   Can you do heavy work around the house like scrubbing floors or lifting and moving heavy furniture?  0 filed at 12/31/2024 1210   Can you do yard work like raking leaves, weeding or pushing a mower? 0 filed at 12/31/2024 1210   Can you have sexual relations? 0 filed at 12/31/2024 1210   Can you participate in moderate recreational activities like golf, " bowling, dancing, doubles tennis or throwing a baseball or football? 0 filed at 12/31/2024 1210   Can you participate in strenous sports like swimming, singles tennis, football, basketball, or skiing? 0 filed at 12/31/2024 1210   DASI SCORE 0 filed at 12/31/2024 1210   METS Score (Will be calculated only when all the questions are answered) 2.7 filed at 12/31/2024 1210          Caprini DVT Assessment    No data to display       Modified Frailty Index    No data to display       CHADS2 Stroke Risk  Current as of 57 minutes ago        N/A 3 to 100%: High Risk   2 to < 3%: Medium Risk   0 to < 2%: Low Risk     Last Change: N/A          This score determines the patient's risk of having a stroke if the patient has atrial fibrillation.        This score is not applicable to this patient. Components are not calculated.          Revised Cardiac Risk Index    No data to display       Apfel Simplified Score    No data to display       Risk Analysis Index Results This Encounter    No data found in the last 10 encounters.       Stop Bang Score      Flowsheet Row Pre-Admission Testing from 12/31/2024 in [facility]   Do you snore loudly? 1 filed at 12/31/2024 1210   Do you often feel tired or fatigued after your sleep? 0 filed at 12/31/2024 1210   Has anyone ever observed you stop breathing in your sleep? 0 filed at 12/31/2024 1210   Do you have or are you being treated for high blood pressure? 1 filed at 12/31/2024 1210   Recent BMI (Calculated) 36.9 filed at 12/31/2024 1210   Is BMI greater than 35 kg/m2? 1=Yes filed at 12/31/2024 1210   Age older than 50 years old? 1=Yes filed at 12/31/2024 1210   Is your neck circumference greater than 17 inches (Male) or 16 inches (Female)? 1 filed at 12/31/2024 1210   Gender - Male 0=No filed at 12/31/2024 1210   STOP-BANG Total Score 5 filed at 12/31/2024 1210          Prodigy: High Risk  Total Score: 15              Prodigy Age Score      Prodigy Previous Opioid Use Score            ARISCAT Score for Postoperative Pulmonary Complications    No data to display       Rg Perioperative Risk for Myocardial Infarction or Cardiac Arrest (SELVIN)    No data to display         Assessment and Plan:   Patient is a 79-year-old female with a history of HTN, HLD, CAD s/p PCI x 2, PAD s/p right carotid endarterectomy, aortic valve stenosis, anemia, TIA, hypothyroidism, rheumatoid arthritis, GERD, obesity and overactive bladder .    #Urge incontinence  #Overactive bladder  -Scheduled for cystoscopy with botulinum toxin injection on 1/7/2025 under sedation with Dr. Currie  -Currently maintained on Vibegron    #HTN, Hx  #HLD, Hx  #CAD s/p PCI, Hx  #PAD s/p right CEA  #Aortic valve stenosis  #TIA, Hx  -Follows with Dr. Kenny last seen September 2023  -5/11/2023 stress test was normal  -EKG obtained today demonstrates normal sinus rhythm with first-degree AV block and right bundle branch block  -Currently maintained on baby aspirin and lisinopril    #Anemia, Hx  CBC from 11/19/2024 shows hemoglobin 9.9 -this is thought to be related to Rinvoq     #Hypothyroidism, Hx  Current maintained on levothyroxine  TSH from 11/19/2024 within normal range    #GERD, Hx  No current issues with diet and lifestyle controlled    #Rheumatoid arthritis, Hx  Currently maintained on Rinvoq    Labs from 11/19/24 reviewed and grossly unremarkable aside from Hg 9.9 (CBC, BMP, TSH, Lipid panel, Mg)  EKG obtained today and demonstrates NSR with 1st degree AV block and RBBB  UA obtained today and pending    I spent 45 minutes in the professional and overall care of this patient. Greater than 50% of this time was spent counseling patient, reviewing plan of care and discussing medication perioperative management.    Dang Phelan, APRN-CNP

## 2024-12-31 NOTE — PREPROCEDURE INSTRUCTIONS
Medication List            Accurate as of December 31, 2024 12:46 PM. Always use your most recent med list.                aspirin 81 mg EC tablet  Medication Adjustments for Surgery: Take/Use as prescribed     buPROPion  mg 24 hr tablet  Commonly known as: Wellbutrin XL  Medication Adjustments for Surgery: Take/Use as prescribed     cholecalciferol 50 mcg (2,000 unit) capsule  Commonly known as: Vitamin D-3  Additional Medication Adjustments for Surgery: Take last dose 7 days before surgery     levothyroxine 137 mcg tablet  Commonly known as: Synthroid, Levoxyl  Medication Adjustments for Surgery: Take/Use as prescribed     Lyrica 150 mg capsule  Generic drug: pregabalin  Medication Adjustments for Surgery: Take/Use as prescribed     magnesium oxide 500 mg magnesium tablet  Medication Adjustments for Surgery: Take/Use as prescribed     morphine CR 15 mg 12 hr tablet  Commonly known as: MS Contin  Medication Adjustments for Surgery: Take/Use as prescribed     multivitamin tablet  Additional Medication Adjustments for Surgery: Take last dose 7 days before surgery     oxyCODONE-acetaminophen 5-325 mg tablet  Commonly known as: Percocet  Medication Adjustments for Surgery: Take/Use as prescribed     Rinvoq 15 mg tablet extended release 24 hr  Generic drug: upadacitinib ER  Medication Adjustments for Surgery: Take/Use as prescribed     sennosides-docusate sodium 8.6-50 mg tablet  Commonly known as: Michaela-Colace  Medication Adjustments for Surgery: Do Not take on the morning of surgery     traZODone 100 mg tablet  Commonly known as: Desyrel  Medication Adjustments for Surgery: Take/Use as prescribed     Tylenol Extra Strength 500 mg tablet  Generic drug: acetaminophen  Medication Adjustments for Surgery: Take/Use as prescribed     * vibegron 75 mg tablet  Take 1 tablet (75 mg) by mouth once daily.  Medication Adjustments for Surgery: Take/Use as prescribed     * Gemtesa 75 mg tablet  Generic drug: vibegron  Take  1 tablet (75 mg) by mouth early in the morning..  Medication Adjustments for Surgery: Take/Use as prescribed     ZestriL 10 mg tablet  Generic drug: lisinopril  Medication Adjustments for Surgery: Take last dose 1 day (24 hours) before surgery           * This list has 2 medication(s) that are the same as other medications prescribed for you. Read the directions carefully, and ask your doctor or other care provider to review them with you.                                  NPO Instructions:    Do not eat any food after midnight the night before your surgery/procedure.    Additional Instructions:     Day of Surgery:  Wear  comfortable loose fitting clothing  Do not use moisturizers, creams, lotions or perfume          PRE-OPERATIVE INSTRUCTIONS FOR SURGERY    *Do not eat anything after midnight the night of surgery.  This includes food of any kind (including hard candy, cough drops, mints).   You may have up to 13 ounces of clear liquid  until TWO hours prior to your scheduled surgery time, unless ERAS* protocol is ordered for you.  Clear liquids include water, black tea/coffee, (no milk or cream) apple juice and electrolyte drinks (GATORADE).  You may chew gum until TWO hours prior you your surgery/procedure.     *ERAS protocol: follow as instructed.  DO not drink an additional 13 ounces as noted above.        *One of our staff members will call you ONE business day before your surgery, between 11 am-2 pm to let you know the time to arrive.  If you have not received a call by 2 pm, call [phone]  *When you arrive at the hospital-->GO TO Registration on the ground floor  *Stop smoking 24 hours prior to surgery.  No Marijuana, CBD Oil or Vaping for 48 hours  *No alcohol 24 hours prior to surgery  *You will need a responsible adult to drive you home  -No acrylic nails or nail polish on at least one fingernail, NO polish on toes for foot surgery  -You may be asked to remove your dentures, partial plate, eyeglasses or  contact lenses before going to surgery.  Please bring a case for these items.  -Body piercings need to be removed.  Jewelry and valuables should be left at home.  -Put on loose,  comfortable, clean clothing, that will accommodate bandages        What is a home antibacterial shower?  This shower is a way of cleaning the skin with a germ killing solution before surgery.  The solution contains chlorhexidine, commonly known as CHG.  CHG is a skin cleanser with germ killing ability.  Let your doctor know if you are allergic to chlorhexidine.    Why do I need to take a preoperative antibacterial shower?  Skin is not sterile.  It is best to try to make your skin as free of germs as possible before surgery.  Proper cleansing with a germ killing soap before surgery can lower the number of germs on your skin.  This helps to reduce the risk of infection at the surgical site.  Following the instructions listed below will help you prepare your skin for surgery.      How do I use the solution?    Steps: Begin using your CHG soap 5 days before your surgery on __________________.    *First, wash and rinse your hair using the CHG soap.  Keep CHG soap away from ear canals and eyes.   Rinse completely, do not condition.  Hair extensions should be removed.    *Wash your face with your normal soap and rinse.   *Apply the CHG solution to a clean wet washcloth.  Turn the water off or move away from the water spray to avoid premature rinsing of the CHG soap as you are applying.  Firmly lather your entire body from the neck down.  Do not use on your face.    *Pay special attention to the area(s) where your incision(s) will be located unless they are on your face.  Avoid scrubbing your skin too hard.  The important part is to have the CHG soap sit on your skin for 3 minutes.   *When the 3 minutes are up, turn on the water and rinse the CHG solution off your body completely.  *Do not wash with regular soap after you  have  used the CHG soap  solution.  *Pat  yourself dry with a clean, freshly laundered towel.  *Do not apply powders, deodorants or lotions.  *Dress in clean freshly laundered night clothes.    *Be sure to sleep with clean freshly laundered sheets.    *Be aware the CHG will cause stains on fabrics; if you wash them with bleach after use.  Rinse your washcloth and other linens that have contact with CHG completely.  Use only non-chlorine detergents to launder the items  used.  *The morning of surgery is the fifth day.  Repeat the above steps and dress in clean comfortable clothing.     What is oral/dental rinse?  It is mouthwash.  It is a way of cleaning the he mouth with a germ-killing solution before your surgery.  The solution contains chlorhexidine, commonly known as CHG.  It is used inside the mouth to kill a bacteria known as Staphylococcus aureus.  Let your doctor know if you are allergic to Chlorhexidine.    Why do I need to use CHG oral/ dental rinse?  The CHG oral/dental rinse helps to kill bacteria in your mouth know as Staphylococcus aureus.  This reduces the risk of infection at the surgical site.    Using your CHG oral/dental rinse    STEPS:    Use your CHG oral/dental rinse after you brush your teeth the night before (at bedtime) and the morning of your surgery.  Follow all the directions on your prescription label.  *Use the cap on the container to measure 15 ml  *Swish (gargle if you can) the mouthwash in your mouth for at least 30 seconds, (do not swallow) and spit out  *After you use your CHG rinse, do not rinse your mouth with water, drink or eat.  Please refer to the prescription label for the appropriate time to resume oral intake.    What side effects might I have using the CHG oral/dental rinse?  CHG rinse will stick you plaque on the teeth.  Brush and floss just before use.   Teeth brushing will help avoid staining of the plaque during  use.  Teeth brushing will help avoid staining of plaque during  use.    Who  should I contact if I have questions about the CHG oral/dental rinse and or CHG soap?  Please call [facility], Pre-Admission testing at [phone] if you have any questions.    What you may be asked to bring to surgery:  ___Crutches, walker  ___CPAP machine  ___Urine specimen       Motorized wheelchair     [facility]  [phone]

## 2025-01-02 DIAGNOSIS — R82.90 ABNORMAL URINALYSIS: ICD-10-CM

## 2025-01-02 LAB
ATRIAL RATE: 58 BPM
P AXIS: 41 DEGREES
P OFFSET: 140 MS
P ONSET: 110 MS
PR INTERVAL: 222 MS
Q ONSET: 221 MS
QRS COUNT: 9 BEATS
QRS DURATION: 152 MS
QT INTERVAL: 458 MS
QTC CALCULATION(BAZETT): 449 MS
QTC FREDERICIA: 452 MS
R AXIS: -18 DEGREES
T AXIS: 6 DEGREES
T OFFSET: 450 MS
VENTRICULAR RATE: 58 BPM

## 2025-01-02 RX ORDER — CEPHALEXIN 250 MG/1
250 CAPSULE ORAL 4 TIMES DAILY
Qty: 28 CAPSULE | Refills: 0 | Status: SHIPPED | OUTPATIENT
Start: 2025-01-02 | End: 2025-01-09

## 2025-01-03 LAB — BACTERIA UR CULT: ABNORMAL

## 2025-01-07 ENCOUNTER — ANESTHESIA EVENT (OUTPATIENT)
Dept: OPERATING ROOM | Facility: HOSPITAL | Age: 80
End: 2025-01-07
Payer: MEDICARE

## 2025-01-07 ENCOUNTER — HOSPITAL ENCOUNTER (OUTPATIENT)
Facility: HOSPITAL | Age: 80
Setting detail: OUTPATIENT SURGERY
Discharge: HOME | End: 2025-01-07
Attending: UROLOGY | Admitting: UROLOGY
Payer: MEDICARE

## 2025-01-07 ENCOUNTER — ANESTHESIA (OUTPATIENT)
Dept: OPERATING ROOM | Facility: HOSPITAL | Age: 80
End: 2025-01-07
Payer: MEDICARE

## 2025-01-07 VITALS
WEIGHT: 182.98 LBS | SYSTOLIC BLOOD PRESSURE: 126 MMHG | HEIGHT: 59 IN | RESPIRATION RATE: 18 BRPM | TEMPERATURE: 98.4 F | OXYGEN SATURATION: 99 % | BODY MASS INDEX: 36.89 KG/M2 | DIASTOLIC BLOOD PRESSURE: 63 MMHG | HEART RATE: 59 BPM

## 2025-01-07 DIAGNOSIS — N39.41 URGE INCONTINENCE: Primary | ICD-10-CM

## 2025-01-07 LAB
ANION GAP SERPL CALC-SCNC: 11 MMOL/L (ref 10–20)
BUN SERPL-MCNC: 19 MG/DL (ref 6–23)
CALCIUM SERPL-MCNC: 9.5 MG/DL (ref 8.6–10.3)
CHLORIDE SERPL-SCNC: 103 MMOL/L (ref 98–107)
CO2 SERPL-SCNC: 26 MMOL/L (ref 21–32)
CREAT SERPL-MCNC: 0.7 MG/DL (ref 0.5–1.05)
EGFRCR SERPLBLD CKD-EPI 2021: 89 ML/MIN/1.73M*2
GLUCOSE SERPL-MCNC: 95 MG/DL (ref 74–99)
HGB BLD-MCNC: 10.3 G/DL (ref 12–16)
PLATELET # BLD AUTO: 241 X10*3/UL (ref 150–450)
POTASSIUM SERPL-SCNC: 4.3 MMOL/L (ref 3.5–5.3)
SODIUM SERPL-SCNC: 136 MMOL/L (ref 136–145)

## 2025-01-07 PROCEDURE — 2500000004 HC RX 250 GENERAL PHARMACY W/ HCPCS (ALT 636 FOR OP/ED): Performed by: ANESTHESIOLOGIST ASSISTANT

## 2025-01-07 PROCEDURE — 85018 HEMOGLOBIN: CPT | Performed by: ANESTHESIOLOGY

## 2025-01-07 PROCEDURE — 36415 COLL VENOUS BLD VENIPUNCTURE: CPT | Performed by: ANESTHESIOLOGY

## 2025-01-07 PROCEDURE — 96372 THER/PROPH/DIAG INJ SC/IM: CPT | Performed by: UROLOGY

## 2025-01-07 PROCEDURE — 52287 CYSTOSCOPY CHEMODENERVATION: CPT | Performed by: UROLOGY

## 2025-01-07 PROCEDURE — 7100000009 HC PHASE TWO TIME - INITIAL BASE CHARGE: Performed by: UROLOGY

## 2025-01-07 PROCEDURE — 7100000010 HC PHASE TWO TIME - EACH INCREMENTAL 1 MINUTE: Performed by: UROLOGY

## 2025-01-07 PROCEDURE — 2500000005 HC RX 250 GENERAL PHARMACY W/O HCPCS: Performed by: UROLOGY

## 2025-01-07 PROCEDURE — 3600000008 HC OR TIME - EACH INCREMENTAL 1 MINUTE - PROCEDURE LEVEL THREE: Performed by: UROLOGY

## 2025-01-07 PROCEDURE — 2720000007 HC OR 272 NO HCPCS: Performed by: UROLOGY

## 2025-01-07 PROCEDURE — 82374 ASSAY BLOOD CARBON DIOXIDE: CPT | Performed by: ANESTHESIOLOGY

## 2025-01-07 PROCEDURE — 85049 AUTOMATED PLATELET COUNT: CPT | Performed by: ANESTHESIOLOGY

## 2025-01-07 PROCEDURE — 3700000002 HC GENERAL ANESTHESIA TIME - EACH INCREMENTAL 1 MINUTE: Performed by: UROLOGY

## 2025-01-07 PROCEDURE — 2500000004 HC RX 250 GENERAL PHARMACY W/ HCPCS (ALT 636 FOR OP/ED): Mod: JZ | Performed by: UROLOGY

## 2025-01-07 PROCEDURE — A52287 PR CYSTOURETHROSCOPY INJ CHEMODENERVATION BLADDER: Performed by: ANESTHESIOLOGIST ASSISTANT

## 2025-01-07 PROCEDURE — A52287 PR CYSTOURETHROSCOPY INJ CHEMODENERVATION BLADDER: Performed by: ANESTHESIOLOGY

## 2025-01-07 PROCEDURE — 3700000001 HC GENERAL ANESTHESIA TIME - INITIAL BASE CHARGE: Performed by: UROLOGY

## 2025-01-07 PROCEDURE — 99100 ANES PT EXTEME AGE<1 YR&>70: CPT | Performed by: ANESTHESIOLOGY

## 2025-01-07 PROCEDURE — 3600000003 HC OR TIME - INITIAL BASE CHARGE - PROCEDURE LEVEL THREE: Performed by: UROLOGY

## 2025-01-07 RX ORDER — PROPOFOL 10 MG/ML
INJECTION, EMULSION INTRAVENOUS AS NEEDED
Status: DISCONTINUED | OUTPATIENT
Start: 2025-01-07 | End: 2025-01-07

## 2025-01-07 RX ORDER — WATER 1 ML/ML
IRRIGANT IRRIGATION AS NEEDED
Status: DISCONTINUED | OUTPATIENT
Start: 2025-01-07 | End: 2025-01-07 | Stop reason: HOSPADM

## 2025-01-07 RX ORDER — LIDOCAINE HCL/PF 100 MG/5ML
SYRINGE (ML) INTRAVENOUS AS NEEDED
Status: DISCONTINUED | OUTPATIENT
Start: 2025-01-07 | End: 2025-01-07

## 2025-01-07 RX ORDER — CEFAZOLIN SODIUM 2 G/100ML
2 INJECTION, SOLUTION INTRAVENOUS ONCE
Status: COMPLETED | OUTPATIENT
Start: 2025-01-07 | End: 2025-01-07

## 2025-01-07 RX ADMIN — PROPOFOL 100 MG: 10 INJECTION, EMULSION INTRAVENOUS at 11:01

## 2025-01-07 RX ADMIN — PROPOFOL 0.4 MG: 10 INJECTION, EMULSION INTRAVENOUS at 11:12

## 2025-01-07 RX ADMIN — PROPOFOL 100 MCG/KG/MIN: 10 INJECTION, EMULSION INTRAVENOUS at 11:02

## 2025-01-07 RX ADMIN — CEFAZOLIN SODIUM 2 G: 2 INJECTION, SOLUTION INTRAVENOUS at 11:05

## 2025-01-07 RX ADMIN — SODIUM CHLORIDE: 9 INJECTION, SOLUTION INTRAVENOUS at 10:56

## 2025-01-07 RX ADMIN — LIDOCAINE HYDROCHLORIDE 100 MG: 20 INJECTION, SOLUTION INTRAVENOUS at 11:01

## 2025-01-07 SDOH — HEALTH STABILITY: MENTAL HEALTH: CURRENT SMOKER: 0

## 2025-01-07 ASSESSMENT — PAIN SCALES - GENERAL
PAIN_LEVEL: 0
PAINLEVEL_OUTOF10: 0 - NO PAIN

## 2025-01-07 ASSESSMENT — COLUMBIA-SUICIDE SEVERITY RATING SCALE - C-SSRS
6. HAVE YOU EVER DONE ANYTHING, STARTED TO DO ANYTHING, OR PREPARED TO DO ANYTHING TO END YOUR LIFE?: NO
1. IN THE PAST MONTH, HAVE YOU WISHED YOU WERE DEAD OR WISHED YOU COULD GO TO SLEEP AND NOT WAKE UP?: NO
2. HAVE YOU ACTUALLY HAD ANY THOUGHTS OF KILLING YOURSELF?: NO

## 2025-01-07 ASSESSMENT — PAIN - FUNCTIONAL ASSESSMENT
PAIN_FUNCTIONAL_ASSESSMENT: 0-10
PAIN_FUNCTIONAL_ASSESSMENT: 0-10

## 2025-01-07 NOTE — BRIEF OP NOTE
Date: 2025  OR Location: [facility] OR    Name: Freedom Jose, : 1945, Age: 79 y.o., MRN: [id number], Sex: female    Diagnosis  Pre-op Diagnosis      * Urge incontinence [N39.41] Post-op Diagnosis     * Urge incontinence [N39.41]     Procedures  CYSTOSCOPY/ BOTOX INJECTION  83094 - MT CYSTOURETHROSCOPY INJ CHEMODENERVATION BLADDER      Surgeons      * Salvador Currie - Primary    Resident/Fellow/Other Assistant:  Surgeons and Role:  * No surgeons found with a matching role *    Staff:   Circulator: Daphnie Martines Person: Lashawn    Anesthesia Staff: Anesthesiologist: Tani Bruce MD  C-AA: DUONG Simmons    Procedure Summary  Anesthesia: Monitor Anesthesia Care  ASA: III  Estimated Blood Loss: 0 mL  Intra-op Medications:   Administrations occurring from 1110 to 1220 on 25:   Medication Name Total Dose   onabotulinumtoxinA (Botox) injection 100 Units   sterile water irrigation solution 3,000 mL   propofol (Diprivan) infusion 10 mg/mL 0.4 mg   NaCl 0.9 % bolus Cannot be calculated              Anesthesia Record               Intraprocedure I/O Totals          Intake    NaCl 0.9 % bolus 125.00 mL    Propofol Drip 0.00 mL    The total shown is the total volume documented since Anesthesia Start was filed.    Total Intake 125 mL          Specimen: No specimens collected               Findings: This is a 79-year-old female who has been seen by me in the past because of a long history of urinary frequency, urgency and urge incontinence.  The patient has been on multiple oral medications in the past and has had no significant improvement in her symptoms.  After discussing options of therapy she has elected to go forward with intravesical Botox therapy.  She now comes into the operating room to have 100 units of Botox injected intravesically.  While in the holding area a huddle was performed with the operating staff the patient her son and myself and she has agreed that we are going forward with cystoscopy and  intravesical injection of Botox.  The patient was then brought into the operating room placed on the operating table in the supine position.  A second huddle was performed and she agreed that she was going in for cystoscopy and intravesical injection of Botox therapy.  At this point the patient was placed under monitored anesthesia care.  She was then positioned in the dorsolithotomy position.  A third huddle was performed and again all agreed that we were going forward with cystoscopy and intravesical injection of 100 units of Botox.  At this point the patient's perineum and genital area prepped and draped in usual manner.  A #22 Congolese*cystoscope was introduced into the urethra and gently passed into the bladder.  Upon entering the bladder no tumors, stones, foreign bodies or diverticula are identified.  The ureteral orifice ease were in their normal anatomic position and refluxing clear urine bilaterally.  At this point are Botox solution was then reconstituted by taking a bottle of 100 units of Botox and adding 10 mL of normal saline without preservative.  This was drawn up into a 10 cc syringe.  1 mL of normal saline was drawn up into a second syringe.  At this point using a flexible needle the Botox was injected intravesically and 1 mL amounts.  After injecting all 10 mL the last injection was utilized using the 1 mL of normal saline to flush the Botox out of the needle into the bladder muscle.  At this point the bladder was reinspected and after ensuring adequate hemostasis the bladder was drained and the cystoscope was removed.  The patient was placed in the supine position, awakened and transferred to the recovery room in satisfactory condition.    Complications:  None; patient tolerated the procedure well.     Disposition: PACU - hemodynamically stable.  Condition: stable  Specimens Collected: No specimens collected  Attending Attestation: I was present and scrubbed for the entire procedure.    Salvador COLE  Kush  Phone Number: [phone]

## 2025-01-07 NOTE — ANESTHESIA POSTPROCEDURE EVALUATION
Patient: Freedom Jose    Procedure Summary       Date: 01/07/25 Room / Location: [facility] OR 03 / Virtual [facility] OR    Anesthesia Start: 1056 Anesthesia Stop: 1122    Procedure: CYSTOSCOPY/ BOTOX INJECTION Diagnosis:       Urge incontinence      (Urge incontinence [N39.41])    Surgeons: Salvador Currie MD Responsible Provider: Tani Bruce MD    Anesthesia Type: MAC ASA Status: 3            Anesthesia Type: MAC    Vitals Value Taken Time   /57 01/07/25 1121   Temp 36.9 °C (98.4 °F) 01/07/25 1121   Pulse 58 01/07/25 1125   Resp 16 01/07/25 1121   SpO2 100 % 01/07/25 1125   Vitals shown include unfiled device data.    Anesthesia Post Evaluation    Patient location during evaluation: PACU  Patient participation: waiting for patient participation  Level of consciousness: awake  Pain score: 0  Pain management: adequate  Airway patency: patent  Cardiovascular status: acceptable and hemodynamically stable  Respiratory status: face mask  Hydration status: acceptable  Postoperative Nausea and Vomiting: none        No notable events documented.

## 2025-01-08 ASSESSMENT — PAIN SCALES - GENERAL: PAINLEVEL_OUTOF10: 0 - NO PAIN

## 2025-01-18 ENCOUNTER — APPOINTMENT (OUTPATIENT)
Dept: RADIOLOGY | Facility: HOSPITAL | Age: 80
End: 2025-01-18
Payer: MEDICARE

## 2025-01-18 ENCOUNTER — HOSPITAL ENCOUNTER (EMERGENCY)
Facility: HOSPITAL | Age: 80
Discharge: HOME | End: 2025-01-18
Payer: MEDICARE

## 2025-01-18 VITALS
OXYGEN SATURATION: 99 % | DIASTOLIC BLOOD PRESSURE: 67 MMHG | RESPIRATION RATE: 18 BRPM | WEIGHT: 182 LBS | HEIGHT: 58 IN | HEART RATE: 70 BPM | BODY MASS INDEX: 38.2 KG/M2 | SYSTOLIC BLOOD PRESSURE: 140 MMHG | TEMPERATURE: 97.9 F

## 2025-01-18 DIAGNOSIS — N30.01 ACUTE CYSTITIS WITH HEMATURIA: Primary | ICD-10-CM

## 2025-01-18 DIAGNOSIS — R93.89 ABNORMAL FINDING ON CT SCAN: ICD-10-CM

## 2025-01-18 LAB
ALBUMIN SERPL BCP-MCNC: 4.1 G/DL (ref 3.4–5)
ALP SERPL-CCNC: 50 U/L (ref 33–136)
ALT SERPL W P-5'-P-CCNC: 8 U/L (ref 7–45)
ANION GAP SERPL CALC-SCNC: 11 MMOL/L (ref 10–20)
APPEARANCE UR: ABNORMAL
AST SERPL W P-5'-P-CCNC: 23 U/L (ref 9–39)
BACTERIA #/AREA URNS AUTO: ABNORMAL /HPF
BASOPHILS # BLD AUTO: 0.04 X10*3/UL (ref 0–0.1)
BASOPHILS NFR BLD AUTO: 0.6 %
BILIRUB SERPL-MCNC: 0.4 MG/DL (ref 0–1.2)
BILIRUB UR STRIP.AUTO-MCNC: NEGATIVE MG/DL
BUN SERPL-MCNC: 19 MG/DL (ref 6–23)
CALCIUM SERPL-MCNC: 9.4 MG/DL (ref 8.6–10.3)
CHLORIDE SERPL-SCNC: 103 MMOL/L (ref 98–107)
CO2 SERPL-SCNC: 26 MMOL/L (ref 21–32)
COLOR UR: YELLOW
CREAT SERPL-MCNC: 0.8 MG/DL (ref 0.5–1.05)
EGFRCR SERPLBLD CKD-EPI 2021: 76 ML/MIN/1.73M*2
EOSINOPHIL # BLD AUTO: 0.1 X10*3/UL (ref 0–0.4)
EOSINOPHIL NFR BLD AUTO: 1.4 %
ERYTHROCYTE [DISTWIDTH] IN BLOOD BY AUTOMATED COUNT: 14.6 % (ref 11.5–14.5)
GLUCOSE SERPL-MCNC: 87 MG/DL (ref 74–99)
GLUCOSE UR STRIP.AUTO-MCNC: NORMAL MG/DL
HCT VFR BLD AUTO: 32.1 % (ref 36–46)
HGB BLD-MCNC: 10.3 G/DL (ref 12–16)
IMM GRANULOCYTES # BLD AUTO: 0.02 X10*3/UL (ref 0–0.5)
IMM GRANULOCYTES NFR BLD AUTO: 0.3 % (ref 0–0.9)
KETONES UR STRIP.AUTO-MCNC: NEGATIVE MG/DL
LACTATE SERPL-SCNC: 0.8 MMOL/L (ref 0.4–2)
LEUKOCYTE ESTERASE UR QL STRIP.AUTO: ABNORMAL
LIPASE SERPL-CCNC: 40 U/L (ref 9–82)
LYMPHOCYTES # BLD AUTO: 2.5 X10*3/UL (ref 0.8–3)
LYMPHOCYTES NFR BLD AUTO: 36 %
MCH RBC QN AUTO: 28.9 PG (ref 26–34)
MCHC RBC AUTO-ENTMCNC: 32.1 G/DL (ref 32–36)
MCV RBC AUTO: 90 FL (ref 80–100)
MONOCYTES # BLD AUTO: 0.47 X10*3/UL (ref 0.05–0.8)
MONOCYTES NFR BLD AUTO: 6.8 %
MUCOUS THREADS #/AREA URNS AUTO: ABNORMAL /LPF
NEUTROPHILS # BLD AUTO: 3.81 X10*3/UL (ref 1.6–5.5)
NEUTROPHILS NFR BLD AUTO: 54.9 %
NITRITE UR QL STRIP.AUTO: ABNORMAL
NRBC BLD-RTO: 0 /100 WBCS (ref 0–0)
PH UR STRIP.AUTO: 5.5 [PH]
PLATELET # BLD AUTO: 278 X10*3/UL (ref 150–450)
POTASSIUM SERPL-SCNC: 4.3 MMOL/L (ref 3.5–5.3)
PROT SERPL-MCNC: 7.2 G/DL (ref 6.4–8.2)
PROT UR STRIP.AUTO-MCNC: ABNORMAL MG/DL
RBC # BLD AUTO: 3.56 X10*6/UL (ref 4–5.2)
RBC # UR STRIP.AUTO: ABNORMAL /UL
RBC #/AREA URNS AUTO: >20 /HPF
SODIUM SERPL-SCNC: 136 MMOL/L (ref 136–145)
SP GR UR STRIP.AUTO: 1.02
SQUAMOUS #/AREA URNS AUTO: ABNORMAL /HPF
UROBILINOGEN UR STRIP.AUTO-MCNC: NORMAL MG/DL
WBC # BLD AUTO: 6.9 X10*3/UL (ref 4.4–11.3)
WBC #/AREA URNS AUTO: >50 /HPF
WBC CLUMPS #/AREA URNS AUTO: ABNORMAL /HPF

## 2025-01-18 PROCEDURE — 74176 CT ABD & PELVIS W/O CONTRAST: CPT | Performed by: STUDENT IN AN ORGANIZED HEALTH CARE EDUCATION/TRAINING PROGRAM

## 2025-01-18 PROCEDURE — 84075 ASSAY ALKALINE PHOSPHATASE: CPT | Performed by: NURSE PRACTITIONER

## 2025-01-18 PROCEDURE — 99284 EMERGENCY DEPT VISIT MOD MDM: CPT | Mod: 25

## 2025-01-18 PROCEDURE — 36415 COLL VENOUS BLD VENIPUNCTURE: CPT | Performed by: NURSE PRACTITIONER

## 2025-01-18 PROCEDURE — 83605 ASSAY OF LACTIC ACID: CPT | Performed by: NURSE PRACTITIONER

## 2025-01-18 PROCEDURE — 83690 ASSAY OF LIPASE: CPT | Performed by: NURSE PRACTITIONER

## 2025-01-18 PROCEDURE — 2500000004 HC RX 250 GENERAL PHARMACY W/ HCPCS (ALT 636 FOR OP/ED): Performed by: NURSE PRACTITIONER

## 2025-01-18 PROCEDURE — 81001 URINALYSIS AUTO W/SCOPE: CPT | Performed by: NURSE PRACTITIONER

## 2025-01-18 PROCEDURE — 87086 URINE CULTURE/COLONY COUNT: CPT | Mod: PARLAB | Performed by: NURSE PRACTITIONER

## 2025-01-18 PROCEDURE — 85025 COMPLETE CBC W/AUTO DIFF WBC: CPT | Performed by: NURSE PRACTITIONER

## 2025-01-18 PROCEDURE — 74176 CT ABD & PELVIS W/O CONTRAST: CPT

## 2025-01-18 PROCEDURE — 96365 THER/PROPH/DIAG IV INF INIT: CPT

## 2025-01-18 RX ORDER — CEPHALEXIN 500 MG/1
500 CAPSULE ORAL 4 TIMES DAILY
Qty: 28 CAPSULE | Refills: 0 | Status: SHIPPED | OUTPATIENT
Start: 2025-01-18 | End: 2025-01-25

## 2025-01-18 RX ORDER — CEFTRIAXONE 1 G/50ML
1 INJECTION, SOLUTION INTRAVENOUS ONCE
Status: COMPLETED | OUTPATIENT
Start: 2025-01-18 | End: 2025-01-18

## 2025-01-18 RX ADMIN — CEFTRIAXONE SODIUM 1 G: 1 INJECTION, SOLUTION INTRAVENOUS at 17:47

## 2025-01-18 ASSESSMENT — COLUMBIA-SUICIDE SEVERITY RATING SCALE - C-SSRS
2. HAVE YOU ACTUALLY HAD ANY THOUGHTS OF KILLING YOURSELF?: NO
1. IN THE PAST MONTH, HAVE YOU WISHED YOU WERE DEAD OR WISHED YOU COULD GO TO SLEEP AND NOT WAKE UP?: NO
6. HAVE YOU EVER DONE ANYTHING, STARTED TO DO ANYTHING, OR PREPARED TO DO ANYTHING TO END YOUR LIFE?: NO

## 2025-01-18 NOTE — ED TRIAGE NOTES
BIBA NRFD from [facility] for patient states inability to urinate since yesterday, and pressure in her bladder. Denies back pain, and fever.

## 2025-01-18 NOTE — ED TRIAGE NOTES
Patient present to the emergency room c/o  inability to urinate, and pressure in her bladder. Denies back pain, and fever.

## 2025-01-18 NOTE — ED PROVIDER NOTES
HPI   Chief Complaint   Patient presents with    Difficulty Urinating     [facility] from [facility] for patient states inability to urinate since yesterday, and pressure in her bladder. Denies back pain, and fever.       Patient is a 79-year-old female brought in by EMS due to dysuria.  Patient has history of CAD, GERD, hyperlipidemia, hypertension, overactive bladder disease, peripheral artery disease, rheumatoid arthritis, and TIA.  Patient does endorse chronic back pain.  She denies any blood in her urine or history of kidney stones.  She denies any fevers or chills.  She denies any urinary incontinence or fecal incontinence.      History provided by:  Patient   used: No            Patient History   Past Medical History:   Diagnosis Date    Aortic valve stenosis     CAD (coronary artery disease)     s/p PCI x2    GERD (gastroesophageal reflux disease)     HLD (hyperlipidemia)     HTN (hypertension)     Hypothyroid     Lung nodules     OAB (overactive bladder)     Obesity     PAD (peripheral artery disease) (CMS-HCC)     s/p R carotid endartarectomy    RA (rheumatoid arthritis)     TIA (transient ischemic attack)      Past Surgical History:   Procedure Laterality Date    ANKLE SURGERY Left     BACK SURGERY      x4 lumbar region     SECTION, CLASSIC      HYSTERECTOMY       Family History   Problem Relation Name Age of Onset    Hypertension Father      Heart disease Father      Heart disease Mother      Hypertension Mother       Social History     Tobacco Use    Smoking status: Never    Smokeless tobacco: Never   Vaping Use    Vaping status: Never Used   Substance Use Topics    Alcohol use: Not Currently     Comment: occasionally    Drug use: Never       Physical Exam   ED Triage Vitals [25 1529]   Temperature Heart Rate Respirations BP   36.6 °C (97.9 °F) 74 16 124/72      Pulse Ox Temp Source Heart Rate Source Patient Position   99 % Temporal Monitor Sitting      BP Location FiO2 (%)      Right arm --       Physical Exam  Vitals and nursing note reviewed.   Constitutional:       General: She is not in acute distress.     Appearance: She is well-developed.   HENT:      Head: Normocephalic and atraumatic.   Eyes:      Conjunctiva/sclera: Conjunctivae normal.   Cardiovascular:      Rate and Rhythm: Normal rate and regular rhythm.      Heart sounds: No murmur heard.  Pulmonary:      Effort: Pulmonary effort is normal. No respiratory distress.      Breath sounds: Normal breath sounds.   Abdominal:      Palpations: Abdomen is soft.      Tenderness: There is no abdominal tenderness. There is right CVA tenderness.   Musculoskeletal:         General: No swelling.      Cervical back: Neck supple.   Skin:     General: Skin is warm and dry.      Capillary Refill: Capillary refill takes less than 2 seconds.   Neurological:      Mental Status: She is alert.   Psychiatric:         Mood and Affect: Mood normal.           ED Course & [facility]   ED Course as of 01/18/25 1744   Sat Jan 18, 2025   1743 Microscopic Only, Urine(!)  UA concerning for UTI. [WS]   1743 Lipase  Not elevated, unlikely pancreatitis [WS]   1743 Lactate  Normal [WS]   1743 CBC and Auto Differential(!)  Leukocytosis or thrombocytopenia.  Stable anemia [WS]   1743 Comprehensive Metabolic Panel  CMP is stable, no electrolyte abnormalities, metabolic disorder, kidney injury, or elevated liver enzymes consistent with liver pathology. [WS]   1743 CT abdomen pelvis wo IV contrast  1. Minimal thickening of the bladder wall anteriorly may be related  to underdistention but early cystitis is not excluded. Correlate with  urinalysis.  2. Large volume stool burden suggestive of constipation.  3. No obstructive nephrolithiasis. No hydronephrosis. Normal appendix.  4. L5 compression deformity is likely chronic but an acute on chronic  process is difficult to exclude.   [WS]      ED Course User Index  [WS] Pa Diaz, APRN-CNP         Diagnoses as of  01/18/25 1744   Acute cystitis with hematuria   Abnormal finding on CT scan                 No data recorded     Fingal Coma Scale Score: 15 (01/18/25 1529 : Tori Gardner RN)                           Medical Decision Making    Medical Decision Making & ED Course  Medical Decision Making:  Patient is a 79-year-old female brought in by EMS due to dysuria.  Patient has history of CAD, GERD, hyperlipidemia, hypertension, overactive bladder disease, peripheral artery disease, rheumatoid arthritis, and TIA.  Patient does endorse chronic back pain.  She denies any blood in her urine or history of kidney stones.  She denies any fevers or chills.  She denies any urinary incontinence or fecal incontinence.  Patient's urinalysis is consistent with UTI.  She was given ceftriaxone IV and will be given Keflex for home.  Patient also had incidental findings on her CT scan consistent with possible renal cyst and possible old compression fracture.  She also had bladder wall thickening concerning for cystitis.  This is correlating with her urinary tract infection.  Incidental findings were discussed with the patient.  Patient states that the back pain has been going on for several months without any known fall.  She will follow-up with her primary doctor regarding the incidental findings.  Patient's lab work is essentially stable otherwise.  She has a stable anemia without leukocytosis or thrombocytopenia.  She has no evidence of electrolyte malady, kidney injury, or liver pathology.  Her lipase and lactate are both normal.  I do believe patient is safe for discharge home in stable condition.    --  Differential diagnoses considered include but are not limited to: UTI, pyelonephritis, musculoskeletal pain, kidney stone.     Social Determinants of Health which Significantly Impact Care: Transportation difficulties The following actions were taken to address these social determinants: Transportation arranged for patient to  home    EKG Independent Interpretation: EKG not obtained    Independent Result Review and Interpretation: Relevant laboratory and radiographic results were reviewed and independently interpreted by myself.  As necessary, they are commented on in the ED Course.    Chronic conditions affecting the patient's care: As documented above in [facility]    The patient was discussed with the following consultants/services: None    Care Considerations: As documented above in [facility]    ED Course:  Diagnoses as of 01/18/25 1742  Acute cystitis with hematuria     Disposition  As a result of the work-up, the patient was discharged home.  she was informed of her diagnosis and instructed to come back with any concerns or worsening of condition.  she and was agreeable to the plan as discussed above.  she was given the opportunity to ask questions.  All of the patient's questions were answered.      Patient was seen independently    POLO Connolly  Emergency Medicine        Amount and/or Complexity of Data Reviewed  Labs: ordered. Decision-making details documented in ED Course.  Radiology: ordered and independent interpretation performed. Decision-making details documented in ED Course.    Risk  OTC drugs.  Prescription drug management.        Procedure  Procedures     POLO Connolly  01/18/25 1746

## 2025-01-18 NOTE — ED TRIAGE NOTES
Secondary to patient volumes and overcrowding, I performed a brief medical screening exam of the patient in triage, as the patient awaits space in the main ED.    History of Present Illness:  Freedom Jose presents with   Chief Complaint   Patient presents with    Difficulty Urinating     Patient present to the emergency room c/o  inability to urinate, and pressure in her bladder. Denies back pain, and fever.       Physical Exam:  General - In no acute distress  Respiratory - Breathing comfortably  Cardiac - Normal S1, S2, no m/g/r  Neurologic - Moving all extremities  Abdomen -bowel sounds present, right CVAT, nontender.    Medical Decision Making:  Patient will require further evaluation in the main ED.    Initial diagnostic tests were ordered from triage.      The patient demonstrates understanding that this initial evaluation is a brief medical screening exam and the expectation is that they await for space in the main ED to be further evaluated.  The patient understands that, if they leave prior to further evaluation in the main ED after this initial evaluation in triage, they are doing so under their own accord knowing that their evaluation/work-up is not yet complete. The patient also understands that any preliminary diagnostic results, including abnormalities, may not be shared with them, if they choose to leave prior to further evaluation in the main ED.

## 2025-01-19 LAB — HOLD SPECIMEN: NORMAL

## 2025-01-21 LAB — BACTERIA UR CULT: ABNORMAL

## 2025-01-22 ENCOUNTER — TELEPHONE (OUTPATIENT)
Dept: PHARMACY | Facility: HOSPITAL | Age: 80
End: 2025-01-22
Payer: MEDICARE

## 2025-01-22 NOTE — PROGRESS NOTES
EDPD Note: Dose Change    Contacted Freedom Jose regarding positive urine culture/result that was taken during their recent emergency room visit. I completed education with patient. The patient is being treated with the proper medication; however, the dose of the discharge prescription is incorrect . I gave verbal education about the new medication dose found below. No further follow up needed from EDPD Team.     Patient presented to the ED 1/18 with symptoms of dysuria and urinary rentention. CT evident of bladder wall thickening concerning for cystitis. Patient discharged with cephalexin 500 mg QID x 7 days. Without systemic symptoms or flank pain (has unrelated chronic back pain), cystitis dosing for cephalexin is 500 mg twice daily. Patient agreeable to dose decrease. Discussed continuing for same duration of 7 days, will have capsules left over. No questions at this time.    Discharged with: cephalexin 500 mg QID x 7 days  Drug: cephalexin 500 mg  Sig: twice daily  Days Supply: 7    Susceptibility data from last 90 days.  Collected Specimen Info Organism Ampicillin Cefazolin Cefazolin (uncomplicated UTIs only) Ciprofloxacin Gentamicin Nitrofurantoin Piperacillin/Tazobactam Trimethoprim/Sulfamethoxazole   01/18/25 Urine from Clean Catch/Voided Escherichia coli  S  S  S  S  S  S  S  S   12/31/24 Urine from Clean Catch/Voided Escherichia coli  S  S  S  S  S  S  S  S       If there are any other questions for the ED Post-Discharge Culture Follow Up Team, please contact [phone]. Fax: [phone].    Sebastien Bangura, YazminD

## 2025-03-06 ENCOUNTER — APPOINTMENT (OUTPATIENT)
Dept: PRIMARY CARE | Facility: CLINIC | Age: 80
End: 2025-03-06
Payer: MEDICARE

## (undated) DEVICE — COLLECTION BAG, FLUID, NON-STERILE

## (undated) DEVICE — NEEDLE, INJETAK ADJUSTABLE TIP

## (undated) DEVICE — Device

## (undated) DEVICE — SYRINGE, 60 CC, IRRIGATION, PISTON, CATH TIP, W/LUER ADAPTER,DISP